# Patient Record
Sex: MALE | Race: BLACK OR AFRICAN AMERICAN | NOT HISPANIC OR LATINO | Employment: OTHER | URBAN - METROPOLITAN AREA
[De-identification: names, ages, dates, MRNs, and addresses within clinical notes are randomized per-mention and may not be internally consistent; named-entity substitution may affect disease eponyms.]

---

## 2020-01-27 ENCOUNTER — OFFICE VISIT (OUTPATIENT)
Dept: FAMILY MEDICINE CLINIC | Facility: CLINIC | Age: 60
End: 2020-01-27

## 2020-01-27 VITALS
BODY MASS INDEX: 28.99 KG/M2 | HEIGHT: 65 IN | WEIGHT: 174 LBS | TEMPERATURE: 97.9 F | DIASTOLIC BLOOD PRESSURE: 80 MMHG | SYSTOLIC BLOOD PRESSURE: 122 MMHG | OXYGEN SATURATION: 98 % | HEART RATE: 91 BPM

## 2020-01-27 DIAGNOSIS — Z79.899 HIGH RISK MEDICATION USE: ICD-10-CM

## 2020-01-27 DIAGNOSIS — E78.5 HYPERLIPIDEMIA, UNSPECIFIED HYPERLIPIDEMIA TYPE: ICD-10-CM

## 2020-01-27 DIAGNOSIS — Z23 IMMUNIZATION DUE: ICD-10-CM

## 2020-01-27 DIAGNOSIS — I10 ESSENTIAL HYPERTENSION: ICD-10-CM

## 2020-01-27 DIAGNOSIS — Z11.59 ENCOUNTER FOR HEPATITIS C SCREENING TEST FOR LOW RISK PATIENT: ICD-10-CM

## 2020-01-27 DIAGNOSIS — Z00.00 MEDICARE ANNUAL WELLNESS VISIT, SUBSEQUENT: Primary | ICD-10-CM

## 2020-01-27 PROCEDURE — G0008 ADMIN INFLUENZA VIRUS VAC: HCPCS | Performed by: FAMILY MEDICINE

## 2020-01-27 PROCEDURE — 99213 OFFICE O/P EST LOW 20 MIN: CPT | Performed by: FAMILY MEDICINE

## 2020-01-27 PROCEDURE — 90686 IIV4 VACC NO PRSV 0.5 ML IM: CPT | Performed by: FAMILY MEDICINE

## 2020-01-27 PROCEDURE — G0439 PPPS, SUBSEQ VISIT: HCPCS | Performed by: FAMILY MEDICINE

## 2020-01-27 RX ORDER — CYCLOBENZAPRINE HCL 10 MG
TABLET ORAL
COMMUNITY
End: 2021-07-16

## 2020-01-27 RX ORDER — ATORVASTATIN CALCIUM 20 MG/1
TABLET, FILM COATED ORAL
COMMUNITY
Start: 2018-02-19 | End: 2020-08-12

## 2020-01-27 RX ORDER — HYDROCODONE BITARTRATE AND ACETAMINOPHEN 10; 325 MG/1; MG/1
TABLET ORAL EVERY 12 HOURS SCHEDULED
COMMUNITY
End: 2020-03-30 | Stop reason: DRUGHIGH

## 2020-01-27 RX ORDER — AMLODIPINE BESYLATE 5 MG/1
TABLET ORAL
COMMUNITY
End: 2020-01-27 | Stop reason: SDUPTHER

## 2020-01-27 RX ORDER — LISINOPRIL AND HYDROCHLOROTHIAZIDE 12.5; 1 MG/1; MG/1
TABLET ORAL
COMMUNITY
End: 2020-01-27 | Stop reason: SDUPTHER

## 2020-01-27 RX ORDER — LISINOPRIL AND HYDROCHLOROTHIAZIDE 12.5; 1 MG/1; MG/1
2 TABLET ORAL DAILY
Qty: 60 TABLET | Refills: 11 | Status: SHIPPED | OUTPATIENT
Start: 2020-01-27 | End: 2020-03-30

## 2020-01-27 RX ORDER — AMITRIPTYLINE HYDROCHLORIDE 25 MG/1
TABLET, FILM COATED ORAL
COMMUNITY
Start: 2018-02-05 | End: 2021-04-16

## 2020-01-27 RX ORDER — AMLODIPINE BESYLATE 5 MG/1
5 TABLET ORAL DAILY
Qty: 30 TABLET | Refills: 11 | Status: SHIPPED | OUTPATIENT
Start: 2020-01-27 | End: 2021-04-12 | Stop reason: SDUPTHER

## 2020-01-27 RX ORDER — GABAPENTIN 100 MG/1
CAPSULE ORAL EVERY 8 HOURS SCHEDULED
COMMUNITY
End: 2022-05-12

## 2020-01-27 NOTE — PROGRESS NOTES
The ABCs of the Annual Wellness Visit  Subsequent Medicare Wellness Visit    Chief Complaint   Patient presents with   • Hypertension   • Medicare Wellness-subsequent       Subjective   History of Present Illness:  Primo Sommers is a 59 y.o. male who presents for a Subsequent Medicare Wellness Visit.    HEALTH RISK ASSESSMENT    Recent Hospitalizations:  No hospitalization(s) within the last year.    Current Medical Providers:  Patient Care Team:  Lindsay Fernandes MD as PCP - General (Family Medicine)    Smoking Status:  Social History     Tobacco Use   Smoking Status Not on file       Alcohol Consumption:  Social History     Substance and Sexual Activity   Alcohol Use Not on file       Depression Screen:   PHQ-2/PHQ-9 Depression Screening 1/27/2020   Little interest or pleasure in doing things 0   Feeling down, depressed, or hopeless 0   Trouble falling or staying asleep, or sleeping too much 0   Feeling tired or having little energy 0   Poor appetite or overeating 0   Feeling bad about yourself - or that you are a failure or have let yourself or your family down 0   Trouble concentrating on things, such as reading the newspaper or watching television 0   Moving or speaking so slowly that other people could have noticed. Or the opposite - being so fidgety or restless that you have been moving around a lot more than usual 0   Thoughts that you would be better off dead, or of hurting yourself in some way 0   Total Score 0   If you checked off any problems, how difficult have these problems made it for you to do your work, take care of things at home, or get along with other people? Not difficult at all       Fall Risk Screen:  STEADI Fall Risk Assessment was completed, and patient is at MODERATE risk for falls. Assessment completed on:1/27/2020    Health Habits and Functional and Cognitive Screening:  Functional & Cognitive Status 1/27/2020   Do you have difficulty preparing food and eating? No   Do you have  difficulty bathing yourself, getting dressed or grooming yourself? No   Do you have difficulty using the toilet? No   Do you have difficulty moving around from place to place? No   Do you have trouble with steps or getting out of a bed or a chair? No   Current Diet Well Balanced Diet   Dental Exam Not up to date   Eye Exam Not up to date   Exercise (times per week) 0 times per week   Current Exercise Activities Include None   Do you need help using the phone?  No   Are you deaf or do you have serious difficulty hearing?  No   Do you need help with transportation? No   Do you need help shopping? No   Do you need help preparing meals?  No   Do you need help with housework?  No   Do you need help with laundry? No   Do you need help taking your medications? No   Do you need help managing money? No   Do you ever drive or ride in a car without wearing a seat belt? No   Have you felt unusual stress, anger or loneliness in the last month? No   Who do you live with? Other   If you need help, do you have trouble finding someone available to you? No   Have you been bothered in the last four weeks by sexual problems? No   Do you have difficulty concentrating, remembering or making decisions? No         Does the patient have evidence of cognitive impairment? No    Asprin use counseling:Does not need ASA (and currently is not on it)    Age-appropriate Screening Schedule:  Refer to the list below for future screening recommendations based on patient's age, sex and/or medical conditions. Orders for these recommended tests are listed in the plan section. The patient has been provided with a written plan.    Health Maintenance   Topic Date Due   • TDAP/TD VACCINES (1 - Tdap) 12/08/1973   • ZOSTER VACCINE (1 of 2) 12/08/2012   • INFLUENZA VACCINE  08/01/2019   • COLONOSCOPY  01/10/2020   • LIPID PANEL  01/27/2020          The following portions of the patient's history were reviewed and updated as appropriate: allergies, current  "medications, past family history, past medical history, past social history, past surgical history and problem list.    Outpatient Medications Prior to Visit   Medication Sig Dispense Refill   • amitriptyline (ELAVIL) 25 MG tablet amitriptyline 25 mg tablet   Take 1 tablet every day by oral route at bedtime for 30 days.     • atorvastatin (LIPITOR) 20 MG tablet atorvastatin 20 mg tablet     • cyclobenzaprine (FLEXERIL) 10 MG tablet cyclobenzaprine 10 mg tablet   Take 1 tablet every day by oral route as needed for 30 days.     • gabapentin (NEURONTIN) 100 MG capsule Every 8 (Eight) Hours.     • HYDROcodone-acetaminophen (NORCO)  MG per tablet Every 12 (Twelve) Hours.     • amLODIPine (NORVASC) 5 MG tablet amlodipine 5 mg tablet     • lisinopril-hydrochlorothiazide (PRINZIDE,ZESTORETIC) 10-12.5 MG per tablet lisinopril 10 mg-hydrochlorothiazide 12.5 mg tablet       No facility-administered medications prior to visit.        Patient Active Problem List   Diagnosis   • Medicare annual wellness visit, subsequent   • Essential hypertension   • Immunization due   • High risk medication use   • Hyperlipidemia       Advanced Care Planning:  Patient does not have an advance directive - information provided to the patient today    Review of Systems   Musculoskeletal: Positive for arthralgias.   All other systems reviewed and are negative.      Compared to one year ago, the patient feels his physical health is worse.  Compared to one year ago, the patient feels his mental health is the same.    Reviewed chart for potential of high risk medication in the elderly: yes  Reviewed chart for potential of harmful drug interactions in the elderly:yes    Objective         Vitals:    01/27/20 0752   BP: 122/80   Pulse: 91   Temp: 97.9 °F (36.6 °C)   SpO2: 98%   Weight: 78.9 kg (174 lb)   Height: 165.1 cm (65\")       Body mass index is 28.96 kg/m².  Discussed the patient's BMI with him. The BMI is above average; BMI management plan " is completed.    Physical Exam   Constitutional: He appears well-developed and well-nourished. No distress.   Cardiovascular: Normal rate and regular rhythm.   Pulmonary/Chest: Effort normal and breath sounds normal. No respiratory distress. He has no wheezes.   Skin: He is not diaphoretic.   Nursing note and vitals reviewed.      Lab Results   Component Value Date    GLU 62 (L) 01/06/2020        Assessment/Plan   Medicare Risks and Personalized Health Plan  CMS Preventative Services Quick Reference  Fall Risk    The above risks/problems have been discussed with the patient.  Pertinent information has been shared with the patient in the After Visit Summary.  Follow up plans and orders are seen below in the Assessment/Plan Section.    Diagnoses and all orders for this visit:    1. Medicare annual wellness visit, subsequent (Primary)    2. Essential hypertension  -     lisinopril-hydrochlorothiazide (PRINZIDE,ZESTORETIC) 10-12.5 MG per tablet; Take 2 tablets by mouth Daily.  Dispense: 60 tablet; Refill: 11  -     amLODIPine (NORVASC) 5 MG tablet; Take 1 tablet by mouth Daily.  Dispense: 30 tablet; Refill: 11    3. Immunization due  -     Fluarix/Fluzone/Afluria Quad>6 Months    4. Hyperlipidemia, unspecified hyperlipidemia type    5. High risk medication use    6. Encounter for hepatitis C screening test for low risk patient  -     Hepatitis C Antibody      Follow Up:  Return in about 1 year (around 1/27/2021) for Medicare Wellness.     An After Visit Summary and PPPS were given to the patient.

## 2020-01-27 NOTE — PATIENT INSTRUCTIONS
Medicare Wellness  Personal Prevention Plan of Service     Date of Office Visit:  2020  Encounter Provider:  Lindsay Fernandes MD  Place of Service:  [facility]  Patient Name: Primo Sommers  :  1960    As part of the Medicare Wellness portion of your visit today, we are providing you with this personalized preventive plan of services (PPPS). This plan is based upon recommendations of the [place] Preventive Services Task Force (USPSTF) and the Advisory Committee on Immunization Practices (ACIP).    This lists the preventive care services that should be considered, and provides dates of when you are due. Items listed as completed are up-to-date and do not require any further intervention.    Health Maintenance   Topic Date Due   • TDAP/TD VACCINES (1 - Tdap) 1973   • ZOSTER VACCINE (1 of 2) 2012   • INFLUENZA VACCINE  2019   • HEPATITIS C SCREENING  01/10/2020   • COLONOSCOPY  01/10/2020   • MEDICARE ANNUAL WELLNESS  2021       No orders of the defined types were placed in this encounter.      Return in about 1 year (around 2021) for Medicare Wellness.

## 2020-01-27 NOTE — PROGRESS NOTES
Subjective   Primo Sommers is a 59 y.o. male.     Chief Complaint   Patient presents with   • Hypertension   • Medicare Wellness-subsequent   • Hyperlipidemia       Hypertension   This is a chronic problem. The current episode started more than 1 year ago. The problem is unchanged. The problem is controlled.   Hyperlipidemia   This is a chronic problem. The problem is controlled. Recent lipid tests were reviewed and are normal.          The following portions of the patient's history were reviewed and updated as appropriate: allergies, current medications, past family history, past medical history, past social history, past surgical history and problem list.    Past Medical History:   Diagnosis Date   • Hyperlipidemia    • Hypertension        No past surgical history on file.    No family history on file.    Social History     Socioeconomic History   • Marital status: Single     Spouse name: Not on file   • Number of children: Not on file   • Years of education: Not on file   • Highest education level: Not on file       Current Outpatient Medications on File Prior to Visit   Medication Sig Dispense Refill   • amitriptyline (ELAVIL) 25 MG tablet amitriptyline 25 mg tablet   Take 1 tablet every day by oral route at bedtime for 30 days.     • atorvastatin (LIPITOR) 20 MG tablet atorvastatin 20 mg tablet     • cyclobenzaprine (FLEXERIL) 10 MG tablet cyclobenzaprine 10 mg tablet   Take 1 tablet every day by oral route as needed for 30 days.     • gabapentin (NEURONTIN) 100 MG capsule Every 8 (Eight) Hours.     • HYDROcodone-acetaminophen (NORCO)  MG per tablet Every 12 (Twelve) Hours.     • [DISCONTINUED] amLODIPine (NORVASC) 5 MG tablet amlodipine 5 mg tablet     • [DISCONTINUED] lisinopril-hydrochlorothiazide (PRINZIDE,ZESTORETIC) 10-12.5 MG per tablet lisinopril 10 mg-hydrochlorothiazide 12.5 mg tablet       No current facility-administered medications on file prior to visit.        Review of Systems    Musculoskeletal: Positive for arthralgias and back pain.   All other systems reviewed and are negative.      Recent Results (from the past 4704 hour(s))   BASIC METABOLIC PANEL    Collection Time: 01/06/20 10:19 AM   Result Value Ref Range    Sodium 144 137 - 145 mmol/L    Potassium 3.7 3.5 - 5.1 mmol/L    Chloride 107 98 - 107 mmol/L    Total CO2 30 22 - 30 mmol/L    Glucose 62 (L) 74 - 99 mg/dL    BUN 10 7 - 20 mg/dL    Creatinine 0.7 0.7 - 1.5 mg/dL    BUN/Creatinine Ratio 14.3 RATIO    Est GFR by Clearance >60 >60 /1.73 m2    Calcium 9.5 8.4 - 10.2 mg/dL   TYPE AND SCREEN    Collection Time: 01/06/20 10:19 AM   Result Value Ref Range    ABORh AB Positive     Antibody Screen Negative     Crossmatch Expiration 01/09/2020    CBC AND DIFFERENTIAL    Collection Time: 01/06/20 10:19 AM   Result Value Ref Range    WBC 11.64 (H) 4.5 - 11.0 10*3/uL    RBC 5.27 4.5 - 5.9 10*6/uL    Hemoglobin 15.1 13.5 - 17.5 g/dL    Hematocrit 45.7 41.0 - 53.0 %    MCV 86.7 80.0 - 100.0 fL    MCH 28.7 26.0 - 34.0 pg    MCHC 33.0 31.0 - 37.0 g/dL    RDW 15.7 12.0 - 16.8 %    Platelets 240 140 - 440 10*3/uL    MPV 9.8 6.7 - 10.8 fL    Differential Type CBC w/Manual Differential (arb'U)    Neutrophil Rel % 45.5 45 - 80 %    Lymphocyte Rel % 39.5 15 - 50 %    Monocyte Rel % 5.3 0 - 15 %    Eosinophil % 7.0 0 - 7 %    Basophil Rel % 1.8 0 - 2 %    nRBC 0 0 /100(WBC)    Neutrophils Absolute 5.30 1.5 - 7.5 /uL    Atypical Lymphocyte Rel % 0.9 0 - 8 %    Neutrophils Absolute 5.30 2.0 - 8.8 10*3/uL    Lymphocytes Absolute 4.60 0.7 - 5.5 10*3/uL    Monocytes Absolute 0.62 0.0 - 1.7 10*3/uL    Eosinophils Absolute 0.81 (H) 0.0 - 0.8 10*3/uL    Basophils Absolute 0.21 (H) 0.0 - 0.2 10*3/uL    Atypical Lymphocytes Absolute 0.10 0.0 - 0.9 10*3/uL    RBC Comment Normal Normal    Platelet Estimate Adequate Adequate     Objective   Vitals:    01/27/20 0752   BP: 122/80   Pulse: 91   Temp: 97.9 °F (36.6 °C)   SpO2: 98%   Weight: 78.9 kg (174 lb)  "  Height: 165.1 cm (65\")     Body mass index is 28.96 kg/m².  Physical Exam   Constitutional: He appears well-developed and well-nourished. No distress.   Cardiovascular: Normal rate and regular rhythm.   Pulmonary/Chest: Effort normal and breath sounds normal. No respiratory distress. He has no wheezes.   Skin: He is not diaphoretic.   Nursing note and vitals reviewed.        Assessment/Plan   Primo was seen today for hypertension, medicare wellness-subsequent and hyperlipidemia.    Diagnoses and all orders for this visit:    Medicare annual wellness visit, subsequent    Essential hypertension  -     lisinopril-hydrochlorothiazide (PRINZIDE,ZESTORETIC) 10-12.5 MG per tablet; Take 2 tablets by mouth Daily.  -     amLODIPine (NORVASC) 5 MG tablet; Take 1 tablet by mouth Daily.    Immunization due  -     Fluarix/Fluzone/Afluria Quad>6 Months    Hyperlipidemia, unspecified hyperlipidemia type    High risk medication use    Encounter for hepatitis C screening test for low risk patient  -     Hepatitis C Antibody      Return in about 3 months (around 4/27/2020) for Medicare Wellness, HYPERTENSION.           "

## 2020-01-28 LAB — HCV AB S/CO SERPL IA: 0.1 S/CO RATIO (ref 0–0.9)

## 2020-02-17 ENCOUNTER — TELEPHONE (OUTPATIENT)
Dept: FAMILY MEDICINE CLINIC | Facility: CLINIC | Age: 60
End: 2020-02-17

## 2020-02-17 NOTE — TELEPHONE ENCOUNTER
[facility] CALLED PT HAS A WOUND ON LEG ASKED FOR VERBAL FOR A NURSE TO COME AND LOOK AT HIS WOUND      GAVE VERBAL AMS 2- @3:20 PM

## 2020-02-21 ENCOUNTER — TELEPHONE (OUTPATIENT)
Dept: FAMILY MEDICINE CLINIC | Facility: CLINIC | Age: 60
End: 2020-02-21

## 2020-02-21 NOTE — TELEPHONE ENCOUNTER
Tara with [facility]  called needing orders on the following patient for pressure wound he has.  She can be reached at  [phone].

## 2020-02-21 NOTE — TELEPHONE ENCOUNTER
Spoke with christine and gave verbal for pressure wound care and continuing care.       Gave verbal.    Wound is dry, no signs of infections.  Tingling on outside of edges of wound.

## 2020-02-24 ENCOUNTER — TELEPHONE (OUTPATIENT)
Dept: FAMILY MEDICINE CLINIC | Facility: CLINIC | Age: 60
End: 2020-02-24

## 2020-02-24 NOTE — TELEPHONE ENCOUNTER
Please find number and give verbal. You did not  Take number so I have no way to contact to give verbal. Please take care of this.

## 2020-02-24 NOTE — TELEPHONE ENCOUNTER
Toshia from [facility] called and states patient is complaining of dark urine and a odor.  She requested a verbal order to have the nurse check for a UA when she comes this week.  Verbal Orders given.

## 2020-02-27 DIAGNOSIS — R31.9 URINARY TRACT INFECTION WITH HEMATURIA, SITE UNSPECIFIED: Primary | ICD-10-CM

## 2020-02-27 DIAGNOSIS — N39.0 URINARY TRACT INFECTION WITH HEMATURIA, SITE UNSPECIFIED: Primary | ICD-10-CM

## 2020-02-27 RX ORDER — CIPROFLOXACIN 500 MG/1
500 TABLET, FILM COATED ORAL 2 TIMES DAILY
Qty: 14 TABLET | Refills: 0 | Status: SHIPPED | OUTPATIENT
Start: 2020-02-27 | End: 2020-03-30 | Stop reason: DRUGHIGH

## 2020-03-12 ENCOUNTER — TELEPHONE (OUTPATIENT)
Dept: FAMILY MEDICINE CLINIC | Facility: CLINIC | Age: 60
End: 2020-03-12

## 2020-03-30 ENCOUNTER — RESULTS ENCOUNTER (OUTPATIENT)
Dept: FAMILY MEDICINE CLINIC | Facility: CLINIC | Age: 60
End: 2020-03-30

## 2020-03-30 ENCOUNTER — OFFICE VISIT (OUTPATIENT)
Dept: FAMILY MEDICINE CLINIC | Facility: CLINIC | Age: 60
End: 2020-03-30

## 2020-03-30 VITALS
TEMPERATURE: 98 F | WEIGHT: 158.8 LBS | HEIGHT: 65 IN | HEART RATE: 91 BPM | RESPIRATION RATE: 12 BRPM | OXYGEN SATURATION: 98 % | DIASTOLIC BLOOD PRESSURE: 82 MMHG | SYSTOLIC BLOOD PRESSURE: 122 MMHG | BODY MASS INDEX: 26.46 KG/M2

## 2020-03-30 DIAGNOSIS — I10 ESSENTIAL HYPERTENSION: Primary | ICD-10-CM

## 2020-03-30 DIAGNOSIS — Z12.11 COLON CANCER SCREENING: ICD-10-CM

## 2020-03-30 DIAGNOSIS — E78.5 HYPERLIPIDEMIA, UNSPECIFIED HYPERLIPIDEMIA TYPE: ICD-10-CM

## 2020-03-30 DIAGNOSIS — K59.00 CONSTIPATION, UNSPECIFIED CONSTIPATION TYPE: ICD-10-CM

## 2020-03-30 PROCEDURE — 99214 OFFICE O/P EST MOD 30 MIN: CPT | Performed by: FAMILY MEDICINE

## 2020-03-30 RX ORDER — ALBUTEROL SULFATE 90 UG/1
AEROSOL, METERED RESPIRATORY (INHALATION)
COMMUNITY
Start: 2020-02-04 | End: 2020-03-30

## 2020-03-30 RX ORDER — HYDROCODONE BITARTRATE AND ACETAMINOPHEN 7.5; 325 MG/1; MG/1
TABLET ORAL
COMMUNITY
Start: 2020-03-13 | End: 2021-11-12

## 2020-03-30 NOTE — PROGRESS NOTES
Subjective   Primo Sommers is a 59 y.o. male.     Chief Complaint   Patient presents with   • Post-op Problem     pain   • Hypertension   • Hyperlipidemia   • Constipation       Hypertension   This is a chronic problem. The current episode started more than 1 year ago. The problem is unchanged. The problem is controlled. Pertinent negatives include no blurred vision, chest pain, palpitations or shortness of breath.   Hyperlipidemia   This is a chronic problem. The current episode started more than 1 year ago. The problem is controlled. Recent lipid tests were reviewed and are normal. Pertinent negatives include no chest pain or shortness of breath.   Constipation   This is a new problem. The current episode started more than 1 month ago. The problem has been gradually improving since onset. Associated symptoms include abdominal pain. Treatments tried: miralax.          The following portions of the patient's history were reviewed and updated as appropriate: allergies, current medications, past family history, past medical history, past social history, past surgical history and problem list.    Past Medical History:   Diagnosis Date   • Advance directive discussed with patient    • Disc degeneration, lumbar    • High risk medication use    • Hyperglycemia    • Hyperlipidemia    • Hypertension    • Leukocytosis    • Medicare annual wellness visit, initial    • Medicare annual wellness visit, subsequent    • Spinal stenosis        Past Surgical History:   Procedure Laterality Date   • BACK SURGERY         Family History   Problem Relation Age of Onset   • Diabetes Mother    • Hyperlipidemia Mother    • Hypertension Mother    • Kidney disease Mother    • Heart disease Mother    • Hypertension Father    • Depression Brother        Social History     Socioeconomic History   • Marital status: Single     Spouse name: Not on file   • Number of children: Not on file   • Years of education: Not on file   • Highest  education level: Not on file   Tobacco Use   • Smoking status: Current Every Day Smoker   • Smokeless tobacco: Never Used   Substance and Sexual Activity   • Alcohol use: Not Currently   • Drug use: Never   • Sexual activity: Yes       Current Outpatient Medications on File Prior to Visit   Medication Sig Dispense Refill   • amitriptyline (ELAVIL) 25 MG tablet amitriptyline 25 mg tablet   Take 1 tablet every day by oral route at bedtime for 30 days.     • amLODIPine (NORVASC) 5 MG tablet Take 1 tablet by mouth Daily. 30 tablet 11   • atorvastatin (LIPITOR) 20 MG tablet atorvastatin 20 mg tablet     • cyclobenzaprine (FLEXERIL) 10 MG tablet cyclobenzaprine 10 mg tablet   Take 1 tablet every day by oral route as needed for 30 days.     • gabapentin (NEURONTIN) 100 MG capsule Every 8 (Eight) Hours.     • HYDROcodone-acetaminophen (NORCO) 7.5-325 MG per tablet      • Naloxegol Oxalate (Movantik) 25 MG tablet Movantik 25 mg tablet   Take 1 tablet every day by oral route as directed for 30 days.     • [DISCONTINUED] albuterol sulfate  (90 Base) MCG/ACT inhaler      • [DISCONTINUED] ciprofloxacin (CIPRO) 500 MG tablet Take 1 tablet by mouth 2 (Two) Times a Day. 14 tablet 0   • [DISCONTINUED] HYDROcodone-acetaminophen (NORCO)  MG per tablet Every 12 (Twelve) Hours.     • [DISCONTINUED] lisinopril-hydrochlorothiazide (PRINZIDE,ZESTORETIC) 10-12.5 MG per tablet Take 2 tablets by mouth Daily. 60 tablet 11     No current facility-administered medications on file prior to visit.        Review of Systems   Constitutional: Negative for fatigue.   Eyes: Negative for blurred vision.   Respiratory: Negative for cough, chest tightness and shortness of breath.    Cardiovascular: Negative for chest pain, palpitations and leg swelling.   Gastrointestinal: Positive for abdominal pain and constipation.   Neurological: Negative for dizziness, light-headedness and headache.       Recent Results (from the past 4704 hour(s))    BASIC METABOLIC PANEL    Collection Time: 01/06/20 10:19 AM   Result Value Ref Range    Sodium 144 137 - 145 mmol/L    Potassium 3.7 3.5 - 5.1 mmol/L    Chloride 107 98 - 107 mmol/L    Total CO2 30 22 - 30 mmol/L    Glucose 62 (L) 74 - 99 mg/dL    BUN 10 7 - 20 mg/dL    Creatinine 0.7 0.7 - 1.5 mg/dL    BUN/Creatinine Ratio 14.3 RATIO    Est GFR by Clearance >60 >60 /1.73 m2    Calcium 9.5 8.4 - 10.2 mg/dL   TYPE AND SCREEN    Collection Time: 01/06/20 10:19 AM   Result Value Ref Range    ABORh AB Positive     Antibody Screen Negative     Crossmatch Expiration 01/09/2020    CBC AND DIFFERENTIAL    Collection Time: 01/06/20 10:19 AM   Result Value Ref Range    WBC 11.64 (H) 4.5 - 11.0 10*3/uL    RBC 5.27 4.5 - 5.9 10*6/uL    Hemoglobin 15.1 13.5 - 17.5 g/dL    Hematocrit 45.7 41.0 - 53.0 %    MCV 86.7 80.0 - 100.0 fL    MCH 28.7 26.0 - 34.0 pg    MCHC 33.0 31.0 - 37.0 g/dL    RDW 15.7 12.0 - 16.8 %    Platelets 240 140 - 440 10*3/uL    MPV 9.8 6.7 - 10.8 fL    Differential Type CBC w/Manual Differential (arb'U)    Neutrophil Rel % 45.5 45 - 80 %    Lymphocyte Rel % 39.5 15 - 50 %    Monocyte Rel % 5.3 0 - 15 %    Eosinophil % 7.0 0 - 7 %    Basophil Rel % 1.8 0 - 2 %    nRBC 0 0 /100(WBC)    Neutrophils Absolute 5.30 1.5 - 7.5 /uL    Atypical Lymphocyte Rel % 0.9 0 - 8 %    Neutrophils Absolute 5.30 2.0 - 8.8 10*3/uL    Lymphocytes Absolute 4.60 0.7 - 5.5 10*3/uL    Monocytes Absolute 0.62 0.0 - 1.7 10*3/uL    Eosinophils Absolute 0.81 (H) 0.0 - 0.8 10*3/uL    Basophils Absolute 0.21 (H) 0.0 - 0.2 10*3/uL    Atypical Lymphocytes Absolute 0.10 0.0 - 0.9 10*3/uL    RBC Comment Normal Normal    Platelet Estimate Adequate Adequate   Hepatitis C Antibody    Collection Time: 01/27/20  8:43 AM   Result Value Ref Range    Hep C Virus Ab 0.1 0.0 - 0.9 s/co ratio   POTASSIUM    Collection Time: 01/29/20  6:04 AM   Result Value Ref Range    Potassium 4.1 3.5 - 5.1 mmol/L   BASIC METABOLIC PANEL    Collection Time: 01/30/20   3:03 AM   Result Value Ref Range    Sodium 141 137 - 145 mmol/L    Potassium 3.7 3.5 - 5.1 mmol/L    Chloride 105 98 - 107 mmol/L    Total CO2 29 22 - 30 mmol/L    Glucose 95 74 - 99 mg/dL    BUN 10 7 - 20 mg/dL    Creatinine 0.7 0.7 - 1.5 mg/dL    BUN/Creatinine Ratio 14.3 RATIO    Est GFR by Clearance >60 >60 /1.73 m2    Calcium 9.5 8.4 - 10.2 mg/dL   PHOSPHORUS    Collection Time: 01/30/20  3:03 AM   Result Value Ref Range    Phosphorus 3.1 2.5 - 4.5 mg/dL   CBC AND DIFFERENTIAL    Collection Time: 01/30/20  9:21 PM   Result Value Ref Range    WBC 21.57 (H) 4.5 - 11.0 10*3/uL    RBC 5.45 4.5 - 5.9 10*6/uL    Hemoglobin 15.9 13.5 - 17.5 g/dL    Hematocrit 47.6 41.0 - 53.0 %    MCV 87.3 80.0 - 100.0 fL    MCH 29.2 26.0 - 34.0 pg    MCHC 33.4 31.0 - 37.0 g/dL    RDW 15.8 12.0 - 16.8 %    Platelets 230 140 - 440 10*3/uL    MPV 10.2 6.7 - 10.8 fL    Differential Type Hospital CBC w/AutoDiff (arb'U)    Neutrophil Rel % 75.6 45 - 80 %    Lymphocyte Rel % 18.4 15 - 50 %    Monocyte Rel % 4.8 0 - 15 %    Eosinophil % 0.8 0 - 7 %    Basophil Rel % 0.1 0 - 2 %    Immature Grans % 0.3 (H) 0 %    nRBC 0 0 /100(WBC)    Neutrophils Absolute 16.30 (H) 2.0 - 8.8 10*3/uL    Lymphocytes Absolute 3.97 0.7 - 5.5 10*3/uL    Monocytes Absolute 1.03 0.0 - 1.7 10*3/uL    Eosinophils Absolute 0.17 0.0 - 0.8 10*3/uL    Basophils Absolute 0.03 0.0 - 0.2 10*3/uL    Immature Grans, Absolute 0.07 <1 10*3/uL   PROTIME-INR    Collection Time: 01/30/20  9:21 PM   Result Value Ref Range    Protime 12.1 10.3 - 13.3 s    INR 1.0 INR   APTT    Collection Time: 01/30/20  9:21 PM   Result Value Ref Range    PTT 27.6 25.3 - 35.0 s   COMPREHENSIVE METABOLIC PANEL    Collection Time: 01/30/20  9:21 PM   Result Value Ref Range    Sodium 143 137 - 145 mmol/L    Potassium 3.3 (L) 3.5 - 5.1 mmol/L    Chloride 100 98 - 107 mmol/L    Total CO2 29 22 - 30 mmol/L    Glucose 107 (H) 74 - 99 mg/dL    BUN 10 7 - 20 mg/dL    Creatinine 0.9 0.7 - 1.5 mg/dL     BUN/Creatinine Ratio 11.1 RATIO    Est GFR by Clearance >60 >60 /1.73 m2    Total Protein 9.3 (H) 6.3 - 8.2 g/dL    Albumin 5.4 (H) 3.5 - 5.0 g/dL    Globulin 3.9 1.5 - 4.5 g/dL    A/G Ratio 1.4 1.1 - 2.5 RATIO    Calcium 10.3 (H) 8.4 - 10.2 mg/dL    Total Bilirubin 0.7 0.2 - 1.3 mg/dL    AST (SGOT) 33 15 - 46 U/L    ALT (SGPT) 21 0 - 50 U/L    Alkaline Phosphatase 148 (H) 38 - 126 U/L   LACTIC ACID, PLASMA    Collection Time: 01/30/20  9:24 PM   Result Value Ref Range    Lactate 2.9 (H) 0.7 - 2.0 mmol/L   LACTIC ACID, PLASMA    Collection Time: 01/30/20 11:51 PM   Result Value Ref Range    Lactate 3.7 (H) 0.7 - 2.0 mmol/L   CBC AND DIFFERENTIAL    Collection Time: 01/31/20  4:34 AM   Result Value Ref Range    WBC 21.99 (H) 4.5 - 11.0 10*3/uL    RBC 4.82 4.5 - 5.9 10*6/uL    Hemoglobin 13.6 13.5 - 17.5 g/dL    Hematocrit 41.2 41.0 - 53.0 %    MCV 85.5 80.0 - 100.0 fL    MCH 28.2 26.0 - 34.0 pg    MCHC 33.0 31.0 - 37.0 g/dL    RDW 15.3 12.0 - 16.8 %    Platelets 204 140 - 440 10*3/uL    MPV 10.1 6.7 - 10.8 fL    Differential Type [facility] CBC w/AutoDiff (arb'U)    Neutrophil Rel % 70.7 45 - 80 %    Lymphocyte Rel % 20.2 15 - 50 %    Monocyte Rel % 7.9 0 - 15 %    Eosinophil % 0.5 0 - 7 %    Basophil Rel % 0.2 0 - 2 %    Immature Grans % 0.5 (H) 0 %    nRBC 0 0 /100(WBC)    Neutrophils Absolute 15.54 (H) 2.0 - 8.8 10*3/uL    Lymphocytes Absolute 4.45 0.7 - 5.5 10*3/uL    Monocytes Absolute 1.73 (H) 0.0 - 1.7 10*3/uL    Eosinophils Absolute 0.11 0.0 - 0.8 10*3/uL    Basophils Absolute 0.05 0.0 - 0.2 10*3/uL    Immature Grans, Absolute 0.11 <1 10*3/uL   BASIC METABOLIC PANEL    Collection Time: 01/31/20  4:34 AM   Result Value Ref Range    Sodium 136 (L) 137 - 145 mmol/L    Potassium 3.8 3.5 - 5.1 mmol/L    Chloride 102 98 - 107 mmol/L    Total CO2 27 22 - 30 mmol/L    Glucose 93 74 - 99 mg/dL    BUN 11 7 - 20 mg/dL    Creatinine 0.8 0.7 - 1.5 mg/dL    BUN/Creatinine Ratio 13.8 RATIO    Est GFR by Clearance >60 >60  /1.73 m2    Calcium 9.0 8.4 - 10.2 mg/dL   PROCALCITONIN    Collection Time: 01/31/20  4:34 AM   Result Value Ref Range    Procalcitonin 0.13 0.0 - 0.5 ng/mL   LACTIC ACID, PLASMA    Collection Time: 01/31/20  8:22 AM   Result Value Ref Range    Lactate 0.8 0.7 - 2.0 mmol/L   HEMOGLOBIN AND HEMATOCRIT, BLOOD    Collection Time: 01/31/20  4:24 PM   Result Value Ref Range    Hemoglobin 10.5 (L) 13.5 - 17.5 g/dL    Hematocrit 32.7 (L) 41.0 - 53.0 %   CBC AND DIFFERENTIAL    Collection Time: 02/01/20  3:56 AM   Result Value Ref Range    WBC 17.06 (H) 4.5 - 11.0 10*3/uL    RBC 3.55 (L) 4.5 - 5.9 10*6/uL    Hemoglobin 10.0 (L) 13.5 - 17.5 g/dL    Hematocrit 30.6 (L) 41.0 - 53.0 %    MCV 86.2 80.0 - 100.0 fL    MCH 28.2 26.0 - 34.0 pg    MCHC 32.7 31.0 - 37.0 g/dL    RDW 15.6 12.0 - 16.8 %    Platelets 167 140 - 440 10*3/uL    MPV 10.0 6.7 - 10.8 fL    Differential Type Hospital CBC w/AutoDiff (arb'U)    Neutrophil Rel % 84.3 (H) 45 - 80 %    Lymphocyte Rel % 8.7 (L) 15 - 50 %    Monocyte Rel % 6.5 0 - 15 %    Eosinophil % 0.0 0 - 7 %    Basophil Rel % 0.1 0 - 2 %    Immature Grans % 0.4 (H) 0 %    nRBC 0 0 /100(WBC)    Neutrophils Absolute 14.39 (H) 2.0 - 8.8 10*3/uL    Lymphocytes Absolute 1.49 0.7 - 5.5 10*3/uL    Monocytes Absolute 1.11 0.0 - 1.7 10*3/uL    Eosinophils Absolute 0.00 0.0 - 0.8 10*3/uL    Basophils Absolute 0.01 0.0 - 0.2 10*3/uL    Immature Grans, Absolute 0.06 <1 10*3/uL   BASIC METABOLIC PANEL    Collection Time: 02/01/20  3:56 AM   Result Value Ref Range    Sodium 140 137 - 145 mmol/L    Potassium 4.1 3.5 - 5.1 mmol/L    Chloride 107 98 - 107 mmol/L    Total CO2 27 22 - 30 mmol/L    Glucose 126 (H) 74 - 99 mg/dL    BUN 11 7 - 20 mg/dL    Creatinine 0.6 (L) 0.7 - 1.5 mg/dL    BUN/Creatinine Ratio 18.3 RATIO    Est GFR by Clearance >60 >60 /1.73 m2    Calcium 8.7 8.4 - 10.2 mg/dL   PHOSPHORUS    Collection Time: 02/01/20  3:56 AM   Result Value Ref Range    Phosphorus 2.3 (L) 2.5 - 4.5 mg/dL   CBC AND  DIFFERENTIAL    Collection Time: 02/02/20  3:10 AM   Result Value Ref Range    WBC 17.54 (H) 4.5 - 11.0 10*3/uL    RBC 3.84 (L) 4.5 - 5.9 10*6/uL    Hemoglobin 10.8 (L) 13.5 - 17.5 g/dL    Hematocrit 33.1 (L) 41.0 - 53.0 %    MCV 86.2 80.0 - 100.0 fL    MCH 28.1 26.0 - 34.0 pg    MCHC 32.6 31.0 - 37.0 g/dL    RDW 15.5 12.0 - 16.8 %    Platelets 214 140 - 440 10*3/uL    MPV 10.0 6.7 - 10.8 fL    Differential Type Hospital CBC w/AutoDiff (arb'U)    Neutrophil Rel % 75.8 45 - 80 %    Lymphocyte Rel % 17.6 15 - 50 %    Monocyte Rel % 5.5 0 - 15 %    Eosinophil % 0.7 0 - 7 %    Basophil Rel % 0.1 0 - 2 %    Immature Grans % 0.3 (H) 0 %    nRBC 0 0 /100(WBC)    Neutrophils Absolute 13.30 (H) 2.0 - 8.8 10*3/uL    Lymphocytes Absolute 3.08 0.7 - 5.5 10*3/uL    Monocytes Absolute 0.97 0.0 - 1.7 10*3/uL    Eosinophils Absolute 0.12 0.0 - 0.8 10*3/uL    Basophils Absolute 0.02 0.0 - 0.2 10*3/uL    Immature Grans, Absolute 0.05 <1 10*3/uL   BASIC METABOLIC PANEL    Collection Time: 02/02/20  3:10 AM   Result Value Ref Range    Sodium 141 137 - 145 mmol/L    Potassium 3.8 3.5 - 5.1 mmol/L    Chloride 105 98 - 107 mmol/L    Total CO2 29 22 - 30 mmol/L    Glucose 80 74 - 99 mg/dL    BUN 10 7 - 20 mg/dL    Creatinine 0.7 0.7 - 1.5 mg/dL    BUN/Creatinine Ratio 14.3 RATIO    Est GFR by Clearance >60 >60 /1.73 m2    Calcium 8.6 8.4 - 10.2 mg/dL   VANCOMYCIN, TROUGH    Collection Time: 02/02/20 10:45 AM   Result Value Ref Range    Vancomycin Trough 13.1 10.0 - 20.0 ug/mL   PROBNP (REFERENCE)    Collection Time: 02/02/20 10:45 AM   Result Value Ref Range    .0 <125 pg/mL   BASIC METABOLIC PANEL    Collection Time: 02/03/20  4:31 AM   Result Value Ref Range    Sodium 139 137 - 145 mmol/L    Potassium 4.2 3.5 - 5.1 mmol/L    Chloride 105 98 - 107 mmol/L    Total CO2 28 22 - 30 mmol/L    Glucose 111 (H) 74 - 99 mg/dL    BUN 10 7 - 20 mg/dL    Creatinine 0.6 (L) 0.7 - 1.5 mg/dL    BUN/Creatinine Ratio 16.7 RATIO    Est GFR by  "Clearance >60 >60 /1.73 m2    Calcium 8.7 8.4 - 10.2 mg/dL   HEMOGLOBIN AND HEMATOCRIT, BLOOD    Collection Time: 02/03/20  4:31 AM   Result Value Ref Range    Hemoglobin 10.2 (L) 13.5 - 17.5 g/dL    Hematocrit 31.3 (L) 41.0 - 53.0 %     Objective   Vitals:    03/30/20 0845   BP: 122/82   BP Location: Left arm   Patient Position: Sitting   Pulse: 91   Resp: 12   Temp: 98 °F (36.7 °C)   TempSrc: Oral   SpO2: 98%   Weight: 72 kg (158 lb 12.8 oz)   Height: 165.1 cm (65\")     Body mass index is 26.43 kg/m².  Physical Exam   Constitutional: He appears well-developed and well-nourished. No distress.   Cardiovascular: Normal rate and regular rhythm.   Pulmonary/Chest: Effort normal and breath sounds normal. No respiratory distress. He has no wheezes.   Skin: He is not diaphoretic.   Nursing note and vitals reviewed.        Assessment/Plan   Primo was seen today for post-op problem, hypertension, hyperlipidemia and constipation.    Diagnoses and all orders for this visit:    Essential hypertension  -     Comprehensive Metabolic Panel  -     Lipid Panel  -     CBC & Differential    Colon cancer screening  -     Cologuard - Stool, Per Rectum; Future    Hyperlipidemia, unspecified hyperlipidemia type  -     Comprehensive Metabolic Panel  -     Lipid Panel    Constipation, unspecified constipation type    Miralax prn.    Return in about 3 months (around 6/30/2020) for HYPERTENSION.         "

## 2020-03-31 LAB
ALBUMIN SERPL-MCNC: 4 G/DL (ref 3.5–5.2)
ALBUMIN/GLOB SERPL: 1.4 G/DL
ALP SERPL-CCNC: 171 U/L (ref 39–117)
ALT SERPL-CCNC: 47 U/L (ref 1–41)
AST SERPL-CCNC: 36 U/L (ref 1–40)
BASOPHILS # BLD AUTO: 0.06 10*3/MM3 (ref 0–0.2)
BASOPHILS NFR BLD AUTO: 0.6 % (ref 0–1.5)
BILIRUB SERPL-MCNC: 0.2 MG/DL (ref 0.2–1.2)
BUN SERPL-MCNC: 8 MG/DL (ref 6–20)
BUN/CREAT SERPL: 9.6 (ref 7–25)
CALCIUM SERPL-MCNC: 10.1 MG/DL (ref 8.6–10.5)
CHLORIDE SERPL-SCNC: 105 MMOL/L (ref 98–107)
CHOLEST SERPL-MCNC: 152 MG/DL (ref 0–200)
CO2 SERPL-SCNC: 27.9 MMOL/L (ref 22–29)
CREAT SERPL-MCNC: 0.83 MG/DL (ref 0.76–1.27)
EOSINOPHIL # BLD AUTO: 0.51 10*3/MM3 (ref 0–0.4)
EOSINOPHIL NFR BLD AUTO: 4.8 % (ref 0.3–6.2)
ERYTHROCYTE [DISTWIDTH] IN BLOOD BY AUTOMATED COUNT: 14.3 % (ref 12.3–15.4)
GLOBULIN SER CALC-MCNC: 2.8 GM/DL
GLUCOSE SERPL-MCNC: 85 MG/DL (ref 65–99)
HCT VFR BLD AUTO: 36.2 % (ref 37.5–51)
HDLC SERPL-MCNC: 33 MG/DL (ref 40–60)
HGB BLD-MCNC: 12.1 G/DL (ref 13–17.7)
IMM GRANULOCYTES # BLD AUTO: 0.03 10*3/MM3 (ref 0–0.05)
IMM GRANULOCYTES NFR BLD AUTO: 0.3 % (ref 0–0.5)
LDLC SERPL CALC-MCNC: 101 MG/DL (ref 0–100)
LYMPHOCYTES # BLD AUTO: 3.98 10*3/MM3 (ref 0.7–3.1)
LYMPHOCYTES NFR BLD AUTO: 37.5 % (ref 19.6–45.3)
MCH RBC QN AUTO: 28.1 PG (ref 26.6–33)
MCHC RBC AUTO-ENTMCNC: 33.4 G/DL (ref 31.5–35.7)
MCV RBC AUTO: 84.2 FL (ref 79–97)
MONOCYTES # BLD AUTO: 0.42 10*3/MM3 (ref 0.1–0.9)
MONOCYTES NFR BLD AUTO: 4 % (ref 5–12)
NEUTROPHILS # BLD AUTO: 5.62 10*3/MM3 (ref 1.7–7)
NEUTROPHILS NFR BLD AUTO: 52.8 % (ref 42.7–76)
NRBC BLD AUTO-RTO: 0 /100 WBC (ref 0–0.2)
PLATELET # BLD AUTO: 294 10*3/MM3 (ref 140–450)
POTASSIUM SERPL-SCNC: 4.8 MMOL/L (ref 3.5–5.2)
PROT SERPL-MCNC: 6.8 G/DL (ref 6–8.5)
RBC # BLD AUTO: 4.3 10*6/MM3 (ref 4.14–5.8)
SODIUM SERPL-SCNC: 142 MMOL/L (ref 136–145)
TRIGL SERPL-MCNC: 91 MG/DL (ref 0–150)
VLDLC SERPL CALC-MCNC: 18.2 MG/DL
WBC # BLD AUTO: 10.62 10*3/MM3 (ref 3.4–10.8)

## 2020-04-03 ENCOUNTER — TELEPHONE (OUTPATIENT)
Dept: FAMILY MEDICINE CLINIC | Facility: CLINIC | Age: 60
End: 2020-04-03

## 2020-04-03 NOTE — TELEPHONE ENCOUNTER
Mikey called [facility] ([phone])  [facility] order    Continue Skill Nursing I gave a verbal

## 2020-08-12 RX ORDER — ATORVASTATIN CALCIUM 20 MG/1
TABLET, FILM COATED ORAL
Qty: 90 TABLET | Refills: 0 | Status: SHIPPED | OUTPATIENT
Start: 2020-08-12 | End: 2020-11-10

## 2020-11-10 RX ORDER — ATORVASTATIN CALCIUM 20 MG/1
TABLET, FILM COATED ORAL
Qty: 30 TABLET | Refills: 0 | Status: SHIPPED | OUTPATIENT
Start: 2020-11-10 | End: 2020-12-10

## 2020-11-12 ENCOUNTER — OFFICE VISIT (OUTPATIENT)
Dept: FAMILY MEDICINE CLINIC | Facility: CLINIC | Age: 60
End: 2020-11-12

## 2020-11-12 VITALS
SYSTOLIC BLOOD PRESSURE: 131 MMHG | WEIGHT: 162.13 LBS | HEIGHT: 65 IN | BODY MASS INDEX: 27.01 KG/M2 | DIASTOLIC BLOOD PRESSURE: 81 MMHG | HEART RATE: 80 BPM | TEMPERATURE: 97.5 F | OXYGEN SATURATION: 100 %

## 2020-11-12 DIAGNOSIS — K59.09 CHRONIC CONSTIPATION: ICD-10-CM

## 2020-11-12 DIAGNOSIS — E78.2 MIXED HYPERLIPIDEMIA: ICD-10-CM

## 2020-11-12 DIAGNOSIS — Z23 IMMUNIZATION DUE: Primary | ICD-10-CM

## 2020-11-12 DIAGNOSIS — I10 ESSENTIAL HYPERTENSION: ICD-10-CM

## 2020-11-12 PROCEDURE — G0008 ADMIN INFLUENZA VIRUS VAC: HCPCS | Performed by: FAMILY MEDICINE

## 2020-11-12 PROCEDURE — 90732 PPSV23 VACC 2 YRS+ SUBQ/IM: CPT | Performed by: FAMILY MEDICINE

## 2020-11-12 PROCEDURE — G0009 ADMIN PNEUMOCOCCAL VACCINE: HCPCS | Performed by: FAMILY MEDICINE

## 2020-11-12 PROCEDURE — 99214 OFFICE O/P EST MOD 30 MIN: CPT | Performed by: FAMILY MEDICINE

## 2020-11-12 PROCEDURE — 90686 IIV4 VACC NO PRSV 0.5 ML IM: CPT | Performed by: FAMILY MEDICINE

## 2020-11-12 NOTE — PROGRESS NOTES
Answers for HPI/ROS submitted by the patient on 11/11/2020   What is the primary reason for your visit?: Other  Please describe your symptoms.: check up  Have you had these symptoms before?: No  How long have you been having these symptoms?: 1-4 days  Please list any medications you are currently taking for this condition.: na  Please describe any probable cause for these symptoms. : na  Answers for HPI/ROS submitted by the patient on 11/11/2020   What is the primary reason for your visit?: Other  Please describe your symptoms.: check up  Have you had these symptoms before?: No  How long have you been having these symptoms?: 1-4 days  Please list any medications you are currently taking for this condition.: na  Please describe any probable cause for these symptoms. : na  Subjective   Primo Sommers is a 59 y.o. male.     Chief Complaint   Patient presents with   • Hypertension   • Follow-up       Hypertension  This is a chronic problem. The current episode started more than 1 year ago. The problem is unchanged. The problem is controlled. Pertinent negatives include no blurred vision, chest pain, palpitations or shortness of breath.   Constipation  This is a chronic problem. The current episode started more than 1 month ago. The problem has been rapidly worsening since onset. His stool frequency is 1 time per day. He has tried laxatives and stool softeners for the symptoms. The treatment provided mild relief.   Hyperlipidemia  This is a chronic problem. The current episode started more than 1 year ago. The problem is controlled. Recent lipid tests were reviewed and are normal. Pertinent negatives include no chest pain or shortness of breath.          The following portions of the patient's history were reviewed and updated as appropriate: allergies, current medications, past family history, past medical history, past social history, past surgical history and problem list.    Past Medical History:   Diagnosis  Date   • Advance directive discussed with patient    • Disc degeneration, lumbar    • High risk medication use    • Hyperglycemia    • Hyperlipidemia    • Hypertension    • Leukocytosis    • Medicare annual wellness visit, initial    • Medicare annual wellness visit, subsequent    • Spinal stenosis        Past Surgical History:   Procedure Laterality Date   • BACK SURGERY         Family History   Problem Relation Age of Onset   • Diabetes Mother    • Hyperlipidemia Mother    • Hypertension Mother    • Kidney disease Mother    • Heart disease Mother    • Hypertension Father    • Depression Brother        Social History     Socioeconomic History   • Marital status: Single     Spouse name: Not on file   • Number of children: Not on file   • Years of education: Not on file   • Highest education level: Not on file   Tobacco Use   • Smoking status: Current Every Day Smoker   • Smokeless tobacco: Never Used   Substance and Sexual Activity   • Alcohol use: Not Currently   • Drug use: Never   • Sexual activity: Yes       Current Outpatient Medications on File Prior to Visit   Medication Sig Dispense Refill   • amitriptyline (ELAVIL) 25 MG tablet amitriptyline 25 mg tablet   Take 1 tablet every day by oral route at bedtime for 30 days.     • amLODIPine (NORVASC) 5 MG tablet Take 1 tablet by mouth Daily. 30 tablet 11   • atorvastatin (LIPITOR) 20 MG tablet TAKE ONE TABLET BY MOUTH DAILY 30 tablet 0   • cyclobenzaprine (FLEXERIL) 10 MG tablet cyclobenzaprine 10 mg tablet   Take 1 tablet every day by oral route as needed for 30 days.     • gabapentin (NEURONTIN) 100 MG capsule Every 8 (Eight) Hours.     • HYDROcodone-acetaminophen (NORCO) 7.5-325 MG per tablet      • Naloxegol Oxalate (Movantik) 25 MG tablet Movantik 25 mg tablet   Take 1 tablet every day by oral route as directed for 30 days.       No current facility-administered medications on file prior to visit.        Review of Systems   Constitutional: Negative for  "fatigue.   Eyes: Negative for blurred vision.   Respiratory: Negative for cough, chest tightness and shortness of breath.    Cardiovascular: Negative for chest pain, palpitations and leg swelling.   Gastrointestinal: Positive for constipation.   Neurological: Negative for dizziness, light-headedness and headache.       No results found for this or any previous visit (from the past 4704 hour(s)).  Objective   Vitals:    11/12/20 0808   BP: 131/81   Pulse: 80   Temp: 97.5 °F (36.4 °C)   SpO2: 100%   Weight: 73.5 kg (162 lb 2 oz)   Height: 165.1 cm (65\")     Body mass index is 26.98 kg/m².  Physical Exam  Vitals signs and nursing note reviewed.   Constitutional:       General: He is not in acute distress.     Appearance: He is well-developed. He is not diaphoretic.   Cardiovascular:      Rate and Rhythm: Normal rate and regular rhythm.   Pulmonary:      Effort: Pulmonary effort is normal. No respiratory distress.      Breath sounds: Normal breath sounds. No wheezing.           Diagnoses and all orders for this visit:    1. Immunization due (Primary)  -     FluLaval Quad >6 Months ([id number])  -     Pneumococcal Polysaccharide Vaccine 23-Valent Greater Than or Equal To 3yo Subcutaneous / IM    2. Essential hypertension  -     Comprehensive Metabolic Panel  -     Lipid Panel  -     CBC & Differential  -     Microalbumin / Creatinine Urine Ratio - Urine, Clean Catch    3. Mixed hyperlipidemia  -     Comprehensive Metabolic Panel  -     Lipid Panel    4. Chronic constipation  -     Ambulatory Referral to Gastroenterology        Return in about 3 months (around 2/12/2021) for Medicare Wellness.        "

## 2020-11-13 LAB
ALBUMIN SERPL-MCNC: 4.2 G/DL (ref 3.5–5.2)
ALBUMIN/CREAT UR: 28 MG/G CREAT (ref 0–29)
ALBUMIN/GLOB SERPL: 1.7 G/DL
ALP SERPL-CCNC: 155 U/L (ref 39–117)
ALT SERPL-CCNC: 15 U/L (ref 1–41)
AST SERPL-CCNC: 14 U/L (ref 1–40)
BASOPHILS # BLD AUTO: 0.08 10*3/MM3 (ref 0–0.2)
BASOPHILS NFR BLD AUTO: 0.7 % (ref 0–1.5)
BILIRUB SERPL-MCNC: 0.2 MG/DL (ref 0–1.2)
BUN SERPL-MCNC: 8 MG/DL (ref 6–20)
BUN/CREAT SERPL: 9.9 (ref 7–25)
CALCIUM SERPL-MCNC: 9.4 MG/DL (ref 8.6–10.5)
CHLORIDE SERPL-SCNC: 105 MMOL/L (ref 98–107)
CHOLEST SERPL-MCNC: 145 MG/DL (ref 0–200)
CO2 SERPL-SCNC: 28.8 MMOL/L (ref 22–29)
CREAT SERPL-MCNC: 0.81 MG/DL (ref 0.76–1.27)
CREAT UR-MCNC: 62.1 MG/DL
EOSINOPHIL # BLD AUTO: 0.41 10*3/MM3 (ref 0–0.4)
EOSINOPHIL NFR BLD AUTO: 3.8 % (ref 0.3–6.2)
ERYTHROCYTE [DISTWIDTH] IN BLOOD BY AUTOMATED COUNT: 14.2 % (ref 12.3–15.4)
GLOBULIN SER CALC-MCNC: 2.5 GM/DL
GLUCOSE SERPL-MCNC: 75 MG/DL (ref 65–99)
HCT VFR BLD AUTO: 42.7 % (ref 37.5–51)
HDLC SERPL-MCNC: 37 MG/DL (ref 40–60)
HGB BLD-MCNC: 14.2 G/DL (ref 13–17.7)
IMM GRANULOCYTES # BLD AUTO: 0.05 10*3/MM3 (ref 0–0.05)
IMM GRANULOCYTES NFR BLD AUTO: 0.5 % (ref 0–0.5)
LDLC SERPL CALC-MCNC: 93 MG/DL (ref 0–100)
LYMPHOCYTES # BLD AUTO: 4.02 10*3/MM3 (ref 0.7–3.1)
LYMPHOCYTES NFR BLD AUTO: 37.2 % (ref 19.6–45.3)
MCH RBC QN AUTO: 27.8 PG (ref 26.6–33)
MCHC RBC AUTO-ENTMCNC: 33.3 G/DL (ref 31.5–35.7)
MCV RBC AUTO: 83.6 FL (ref 79–97)
MICROALBUMIN UR-MCNC: 17.5 UG/ML
MONOCYTES # BLD AUTO: 0.54 10*3/MM3 (ref 0.1–0.9)
MONOCYTES NFR BLD AUTO: 5 % (ref 5–12)
NEUTROPHILS # BLD AUTO: 5.71 10*3/MM3 (ref 1.7–7)
NEUTROPHILS NFR BLD AUTO: 52.8 % (ref 42.7–76)
NRBC BLD AUTO-RTO: 0 /100 WBC (ref 0–0.2)
PLATELET # BLD AUTO: 236 10*3/MM3 (ref 140–450)
POTASSIUM SERPL-SCNC: 4.2 MMOL/L (ref 3.5–5.2)
PROT SERPL-MCNC: 6.7 G/DL (ref 6–8.5)
RBC # BLD AUTO: 5.11 10*6/MM3 (ref 4.14–5.8)
SODIUM SERPL-SCNC: 143 MMOL/L (ref 136–145)
TRIGL SERPL-MCNC: 78 MG/DL (ref 0–150)
VLDLC SERPL CALC-MCNC: 15 MG/DL (ref 5–40)
WBC # BLD AUTO: 10.81 10*3/MM3 (ref 3.4–10.8)

## 2020-12-10 RX ORDER — ATORVASTATIN CALCIUM 20 MG/1
TABLET, FILM COATED ORAL
Qty: 30 TABLET | Refills: 2 | Status: SHIPPED | OUTPATIENT
Start: 2020-12-10 | End: 2021-04-12 | Stop reason: SDUPTHER

## 2021-04-09 DIAGNOSIS — I10 ESSENTIAL HYPERTENSION: ICD-10-CM

## 2021-04-12 RX ORDER — AMLODIPINE BESYLATE 5 MG/1
5 TABLET ORAL DAILY
Qty: 30 TABLET | Refills: 11 | Status: SHIPPED | OUTPATIENT
Start: 2021-04-12 | End: 2021-07-16

## 2021-04-12 RX ORDER — ATORVASTATIN CALCIUM 20 MG/1
TABLET, FILM COATED ORAL
Qty: 90 TABLET | Refills: 1 | OUTPATIENT
Start: 2021-04-12

## 2021-04-12 RX ORDER — LISINOPRIL AND HYDROCHLOROTHIAZIDE 12.5; 1 MG/1; MG/1
TABLET ORAL
Qty: 180 TABLET | Refills: 10 | OUTPATIENT
Start: 2021-04-12

## 2021-04-12 RX ORDER — ATORVASTATIN CALCIUM 20 MG/1
20 TABLET, FILM COATED ORAL DAILY
Qty: 30 TABLET | Refills: 2 | Status: SHIPPED | OUTPATIENT
Start: 2021-04-12 | End: 2021-08-09

## 2021-04-12 RX ORDER — AMLODIPINE BESYLATE 5 MG/1
TABLET ORAL
Qty: 90 TABLET | Refills: 10 | OUTPATIENT
Start: 2021-04-12

## 2021-04-16 ENCOUNTER — OFFICE VISIT (OUTPATIENT)
Dept: FAMILY MEDICINE CLINIC | Facility: CLINIC | Age: 61
End: 2021-04-16

## 2021-04-16 VITALS
HEIGHT: 65 IN | HEART RATE: 76 BPM | DIASTOLIC BLOOD PRESSURE: 82 MMHG | SYSTOLIC BLOOD PRESSURE: 151 MMHG | TEMPERATURE: 96.9 F | OXYGEN SATURATION: 97 % | BODY MASS INDEX: 25.49 KG/M2 | WEIGHT: 153 LBS

## 2021-04-16 DIAGNOSIS — I10 ESSENTIAL HYPERTENSION: ICD-10-CM

## 2021-04-16 DIAGNOSIS — E78.2 MIXED HYPERLIPIDEMIA: ICD-10-CM

## 2021-04-16 DIAGNOSIS — Z79.899 HIGH RISK MEDICATION USE: ICD-10-CM

## 2021-04-16 DIAGNOSIS — Z00.00 MEDICARE ANNUAL WELLNESS VISIT, SUBSEQUENT: Primary | ICD-10-CM

## 2021-04-16 PROBLEM — M47.12 OSTEOARTHRITIS OF CERVICAL SPINE WITH MYELOPATHY: Status: ACTIVE | Noted: 2018-04-16

## 2021-04-16 PROBLEM — Z72.0 TOBACCO USE: Status: ACTIVE | Noted: 2020-01-29

## 2021-04-16 PROBLEM — M54.50 LOW BACK PAIN: Status: ACTIVE | Noted: 2017-08-15

## 2021-04-16 PROBLEM — IMO0002 PSEUDOARTHROSIS OF SPINE: Status: ACTIVE | Noted: 2018-03-05

## 2021-04-16 PROBLEM — K59.03 DRUG-INDUCED CONSTIPATION: Status: ACTIVE | Noted: 2020-03-17

## 2021-04-16 PROBLEM — IMO0002 HERNIATION OF INTERVERTEBRAL DISC: Status: ACTIVE | Noted: 2017-08-15

## 2021-04-16 PROBLEM — R33.9 URINARY RETENTION: Status: ACTIVE | Noted: 2020-01-29

## 2021-04-16 PROBLEM — T81.9XXA COMPLICATION OF SURGICAL PROCEDURE: Status: ACTIVE | Noted: 2017-08-15

## 2021-04-16 PROBLEM — M79.604 PAIN IN BOTH LOWER EXTREMITIES: Status: ACTIVE | Noted: 2018-02-05

## 2021-04-16 PROBLEM — F90.9 LONG-TERM CURRENT USE OF DRUG THERAPY FOR ATTENTION DEFICIT HYPERACTIVITY DISORDER (ADHD): Status: ACTIVE | Noted: 2017-08-15

## 2021-04-16 PROBLEM — M79.605 PAIN IN BOTH LOWER EXTREMITIES: Status: ACTIVE | Noted: 2018-02-05

## 2021-04-16 PROCEDURE — G0439 PPPS, SUBSEQ VISIT: HCPCS | Performed by: FAMILY MEDICINE

## 2021-04-16 RX ORDER — METHYLNALTREXONE BROMIDE 150 MG/1
TABLET ORAL
COMMUNITY
End: 2021-04-16

## 2021-04-16 RX ORDER — LISINOPRIL AND HYDROCHLOROTHIAZIDE 12.5; 1 MG/1; MG/1
1 TABLET ORAL 2 TIMES DAILY
Qty: 60 TABLET | Refills: 11 | Status: SHIPPED | OUTPATIENT
Start: 2021-04-16 | End: 2022-05-24

## 2021-04-16 RX ORDER — LISINOPRIL AND HYDROCHLOROTHIAZIDE 12.5; 1 MG/1; MG/1
TABLET ORAL
COMMUNITY
Start: 2021-01-09 | End: 2021-04-16 | Stop reason: SDUPTHER

## 2021-04-16 NOTE — PROGRESS NOTES
The ABCs of the Annual Wellness Visit  Subsequent Medicare Wellness Visit    Chief Complaint   Patient presents with   • Medicare Wellness-subsequent       Subjective   History of Present Illness:  Primo Sommers is a 60 y.o. male who presents for a Subsequent Medicare Wellness Visit.    HEALTH RISK ASSESSMENT    Recent Hospitalizations:  Yes, [facility]    Current Medical Providers:  Patient Care Team:  Lindsay Fernandes MD as PCP - General (Family Medicine)    Smoking Status:  Social History     Tobacco Use   Smoking Status Current Every Day Smoker   Smokeless Tobacco Never Used       Alcohol Consumption:  Social History     Substance and Sexual Activity   Alcohol Use Not Currently       Depression Screen:   PHQ-2/PHQ-9 Depression Screening 4/16/2021   Little interest or pleasure in doing things 0   Feeling down, depressed, or hopeless 0   Trouble falling or staying asleep, or sleeping too much 0   Feeling tired or having little energy 0   Poor appetite or overeating 0   Feeling bad about yourself - or that you are a failure or have let yourself or your family down 0   Trouble concentrating on things, such as reading the newspaper or watching television 0   Moving or speaking so slowly that other people could have noticed. Or the opposite - being so fidgety or restless that you have been moving around a lot more than usual 0   Thoughts that you would be better off dead, or of hurting yourself in some way 0   Total Score 0   If you checked off any problems, how difficult have these problems made it for you to do your work, take care of things at home, or get along with other people? Not difficult at all       Fall Risk Screen:  STEADI Fall Risk Assessment was completed, and patient is at LOW risk for falls.Assessment completed on:4/16/2021    Health Habits and Functional and Cognitive Screening:  Functional & Cognitive Status 4/16/2021   Do you have difficulty preparing food and eating? No   Do you have  difficulty bathing yourself, getting dressed or grooming yourself? No   Do you have difficulty using the toilet? Yes   Do you have difficulty moving around from place to place? No   Do you have trouble with steps or getting out of a bed or a chair? No   Current Diet Unhealthy Diet   Dental Exam Not up to date   Eye Exam Not up to date   Exercise (times per week) 0 times per week   Current Exercises Include No Regular Exercise   Current Exercise Activities Include -   Do you need help using the phone?  No   Are you deaf or do you have serious difficulty hearing?  No   Do you need help with transportation? No   Do you need help shopping? No   Do you need help preparing meals?  No   Do you need help with housework?  No   Do you need help with laundry? No   Do you need help taking your medications? No   Do you need help managing money? No   Do you ever drive or ride in a car without wearing a seat belt? No   Have you felt unusual stress, anger or loneliness in the last month? No   Who do you live with? Other   If you need help, do you have trouble finding someone available to you? No   Have you been bothered in the last four weeks by sexual problems? -   Do you have difficulty concentrating, remembering or making decisions? No         Does the patient have evidence of cognitive impairment? No    Asprin use counseling:Does not need ASA (and currently is not on it)    Age-appropriate Screening Schedule:  Refer to the list below for future screening recommendations based on patient's age, sex and/or medical conditions. Orders for these recommended tests are listed in the plan section. The patient has been provided with a written plan.    Health Maintenance   Topic Date Due   • TDAP/TD VACCINES (1 - Tdap) Never done   • ZOSTER VACCINE (1 of 2) Never done   • INFLUENZA VACCINE  08/01/2021   • LIPID PANEL  11/12/2021          The following portions of the patient's history were reviewed and updated as appropriate: allergies,  current medications, past family history, past medical history, past social history, past surgical history and problem list.    Outpatient Medications Prior to Visit   Medication Sig Dispense Refill   • amLODIPine (NORVASC) 5 MG tablet Take 1 tablet by mouth Daily. 30 tablet 11   • atorvastatin (LIPITOR) 20 MG tablet Take 1 tablet by mouth Daily. 30 tablet 2   • cyclobenzaprine (FLEXERIL) 10 MG tablet cyclobenzaprine 10 mg tablet   Take 1 tablet every day by oral route as needed for 30 days.     • gabapentin (NEURONTIN) 100 MG capsule Every 8 (Eight) Hours.     • HYDROcodone-acetaminophen (NORCO) 7.5-325 MG per tablet      • amitriptyline (ELAVIL) 25 MG tablet amitriptyline 25 mg tablet   Take 1 tablet every day by oral route at bedtime for 30 days.     • lisinopril-hydrochlorothiazide (PRINZIDE,ZESTORETIC) 10-12.5 MG per tablet      • Methylnaltrexone Bromide (Relistor) 150 MG tablet Relistor 150 mg tablet   Take 3 tablets every day by oral route as needed for 30 days.     • Naloxegol Oxalate (Movantik) 25 MG tablet Movantik 25 mg tablet   Take 1 tablet every day by oral route as directed for 30 days.       No facility-administered medications prior to visit.       Patient Active Problem List   Diagnosis   • Medicare annual wellness visit, subsequent   • Essential hypertension   • Immunization due   • High risk medication use   • Hyperlipidemia   • Constipation   • Long-term current use of drug therapy for attention deficit hyperactivity disorder (ADHD)   • Spinal stenosis of lumbar region   • Chronic pain disorder   • Complication of surgical procedure   • Low back pain   • Herniation of intervertebral disc   • Low back pain   • Lumbar radiculopathy   • Myofascial pain   • Osteoarthritis of cervical spine with myelopathy   • Pain in both lower extremities   • Pseudoarthrosis of spine   • Status post lumbar surgery   • Tobacco use   • Urinary retention   • Urticaria   • Drug-induced constipation   • Hypertension  "      Advanced Care Planning:  ACP discussion was held with the patient during this visit. Patient does not have an advance directive, information provided.    Review of Systems   Constitutional: Negative.        Compared to one year ago, the patient feels his physical health is worse.  Compared to one year ago, the patient feels his mental health is the same.    Reviewed chart for potential of high risk medication in the elderly: yes  Reviewed chart for potential of harmful drug interactions in the elderly:yes    Objective         Vitals:    04/16/21 1021   BP: 151/82   Pulse: 76   Temp: 96.9 °F (36.1 °C)   SpO2: 97%   Weight: 69.4 kg (153 lb)   Height: 165.1 cm (65\")       Body mass index is 25.46 kg/m².  Discussed the patient's BMI with him. The BMI is above average; BMI management plan is completed.    Physical Exam  Vitals and nursing note reviewed.   Constitutional:       General: He is not in acute distress.     Appearance: He is well-developed. He is not diaphoretic.   Cardiovascular:      Rate and Rhythm: Normal rate and regular rhythm.   Pulmonary:      Effort: Pulmonary effort is normal. No respiratory distress.      Breath sounds: Normal breath sounds. No wheezing.               Assessment/Plan   Medicare Risks and Personalized Health Plan  CMS Preventative Services Quick Reference  Fall Risk    The above risks/problems have been discussed with the patient.  Pertinent information has been shared with the patient in the After Visit Summary.  Follow up plans and orders are seen below in the Assessment/Plan Section.    Diagnoses and all orders for this visit:    1. Medicare annual wellness visit, subsequent (Primary)    2. Essential hypertension  -     lisinopril-hydrochlorothiazide (PRINZIDE,ZESTORETIC) 10-12.5 MG per tablet; Take 1 tablet by mouth 2 (two) times a day.  Dispense: 60 tablet; Refill: 11  -     Comprehensive Metabolic Panel  -     Lipid Panel  -     CBC & Differential    3. Mixed " hyperlipidemia  -     Comprehensive Metabolic Panel  -     Lipid Panel  -     CBC & Differential    4. High risk medication use      Follow Up:  Return in about 1 year (around 4/16/2022) for Medicare Wellness.     An After Visit Summary and PPPS were given to the patient.             Answers for HPI/ROS submitted by the patient on 4/16/2021  What is the primary reason for your visit?: Physical

## 2021-04-17 LAB
ALBUMIN SERPL-MCNC: 4.7 G/DL (ref 3.5–5.2)
ALBUMIN/GLOB SERPL: 2 G/DL
ALP SERPL-CCNC: 154 U/L (ref 39–117)
ALT SERPL-CCNC: 7 U/L (ref 1–41)
AST SERPL-CCNC: 13 U/L (ref 1–40)
BASOPHILS # BLD AUTO: 0.07 10*3/MM3 (ref 0–0.2)
BASOPHILS NFR BLD AUTO: 0.7 % (ref 0–1.5)
BILIRUB SERPL-MCNC: 0.3 MG/DL (ref 0–1.2)
BUN SERPL-MCNC: 8 MG/DL (ref 6–20)
BUN/CREAT SERPL: 9.2 (ref 7–25)
CALCIUM SERPL-MCNC: 10.3 MG/DL (ref 8.6–10.5)
CHLORIDE SERPL-SCNC: 102 MMOL/L (ref 98–107)
CHOLEST SERPL-MCNC: 151 MG/DL (ref 0–200)
CO2 SERPL-SCNC: 30.8 MMOL/L (ref 22–29)
CREAT SERPL-MCNC: 0.87 MG/DL (ref 0.76–1.27)
EOSINOPHIL # BLD AUTO: 0.34 10*3/MM3 (ref 0–0.4)
EOSINOPHIL NFR BLD AUTO: 3.3 % (ref 0.3–6.2)
ERYTHROCYTE [DISTWIDTH] IN BLOOD BY AUTOMATED COUNT: 15.1 % (ref 12.3–15.4)
GLOBULIN SER CALC-MCNC: 2.4 GM/DL
GLUCOSE SERPL-MCNC: 73 MG/DL (ref 65–99)
HCT VFR BLD AUTO: 47.3 % (ref 37.5–51)
HDLC SERPL-MCNC: 38 MG/DL (ref 40–60)
HGB BLD-MCNC: 15.3 G/DL (ref 13–17.7)
IMM GRANULOCYTES # BLD AUTO: 0.03 10*3/MM3 (ref 0–0.05)
IMM GRANULOCYTES NFR BLD AUTO: 0.3 % (ref 0–0.5)
LDLC SERPL CALC-MCNC: 101 MG/DL (ref 0–100)
LYMPHOCYTES # BLD AUTO: 4.14 10*3/MM3 (ref 0.7–3.1)
LYMPHOCYTES NFR BLD AUTO: 40.2 % (ref 19.6–45.3)
MCH RBC QN AUTO: 27.8 PG (ref 26.6–33)
MCHC RBC AUTO-ENTMCNC: 32.3 G/DL (ref 31.5–35.7)
MCV RBC AUTO: 86 FL (ref 79–97)
MONOCYTES # BLD AUTO: 0.52 10*3/MM3 (ref 0.1–0.9)
MONOCYTES NFR BLD AUTO: 5.1 % (ref 5–12)
NEUTROPHILS # BLD AUTO: 5.19 10*3/MM3 (ref 1.7–7)
NEUTROPHILS NFR BLD AUTO: 50.4 % (ref 42.7–76)
NRBC BLD AUTO-RTO: 0 /100 WBC (ref 0–0.2)
PLATELET # BLD AUTO: 266 10*3/MM3 (ref 140–450)
POTASSIUM SERPL-SCNC: 4.3 MMOL/L (ref 3.5–5.2)
PROT SERPL-MCNC: 7.1 G/DL (ref 6–8.5)
RBC # BLD AUTO: 5.5 10*6/MM3 (ref 4.14–5.8)
SODIUM SERPL-SCNC: 140 MMOL/L (ref 136–145)
TRIGL SERPL-MCNC: 60 MG/DL (ref 0–150)
VLDLC SERPL CALC-MCNC: 12 MG/DL (ref 5–40)
WBC # BLD AUTO: 10.29 10*3/MM3 (ref 3.4–10.8)

## 2021-07-16 ENCOUNTER — OFFICE VISIT (OUTPATIENT)
Dept: FAMILY MEDICINE CLINIC | Facility: CLINIC | Age: 61
End: 2021-07-16

## 2021-07-16 VITALS
HEIGHT: 65 IN | BODY MASS INDEX: 24.16 KG/M2 | DIASTOLIC BLOOD PRESSURE: 75 MMHG | TEMPERATURE: 98 F | SYSTOLIC BLOOD PRESSURE: 115 MMHG | HEART RATE: 80 BPM | OXYGEN SATURATION: 97 % | WEIGHT: 145 LBS

## 2021-07-16 DIAGNOSIS — R63.4 WEIGHT LOSS: ICD-10-CM

## 2021-07-16 DIAGNOSIS — Z79.899 HIGH RISK MEDICATION USE: ICD-10-CM

## 2021-07-16 DIAGNOSIS — R21 RASH: ICD-10-CM

## 2021-07-16 DIAGNOSIS — I10 ESSENTIAL HYPERTENSION: Primary | ICD-10-CM

## 2021-07-16 DIAGNOSIS — L23.7 POISON IVY: ICD-10-CM

## 2021-07-16 DIAGNOSIS — E78.2 MIXED HYPERLIPIDEMIA: ICD-10-CM

## 2021-07-16 DIAGNOSIS — E55.9 VITAMIN D DEFICIENCY: ICD-10-CM

## 2021-07-16 DIAGNOSIS — R53.82 CHRONIC FATIGUE: ICD-10-CM

## 2021-07-16 PROCEDURE — 99214 OFFICE O/P EST MOD 30 MIN: CPT | Performed by: FAMILY MEDICINE

## 2021-07-16 RX ORDER — METHYLPREDNISOLONE 4 MG/1
TABLET ORAL
Qty: 21 EACH | Refills: 0 | Status: SHIPPED | OUTPATIENT
Start: 2021-07-16 | End: 2021-08-13

## 2021-07-16 NOTE — PROGRESS NOTES
Subjective   Primo Sommers is a 60 y.o. male.     Chief Complaint   Patient presents with   • Follow-up       History of Present Illness     Hypertension follow-up, hypotensive today.  Hyperlipidemia follow-up.  Patient has not been noticed to lose weight 10 pounds since last visit.  Chronic abdominal pain follow-up.  He is going for endoscopy in the next 2 weeks  He has been working outside in the last several days and complaining on bilateral upper extremity rash and itching  The following portions of the patient's history were reviewed and updated as appropriate: allergies, current medications, past family history, past medical history, past social history, past surgical history and problem list.    Past Medical History:   Diagnosis Date   • Advance directive discussed with patient    • Disc degeneration, lumbar    • High risk medication use    • Hyperglycemia    • Hyperlipidemia    • Hypertension    • Leukocytosis    • Medicare annual wellness visit, initial    • Medicare annual wellness visit, subsequent    • Spinal stenosis        Past Surgical History:   Procedure Laterality Date   • BACK SURGERY         Family History   Problem Relation Age of Onset   • Diabetes Mother    • Hyperlipidemia Mother    • Hypertension Mother    • Kidney disease Mother    • Heart disease Mother    • Hypertension Father    • Depression Brother        Social History     Socioeconomic History   • Marital status: Single     Spouse name: Not on file   • Number of children: Not on file   • Years of education: Not on file   • Highest education level: Not on file   Tobacco Use   • Smoking status: Current Every Day Smoker   • Smokeless tobacco: Never Used   Substance and Sexual Activity   • Alcohol use: Not Currently   • Drug use: Never   • Sexual activity: Yes       Current Outpatient Medications on File Prior to Visit   Medication Sig Dispense Refill   • atorvastatin (LIPITOR) 20 MG tablet Take 1 tablet by mouth Daily. 30 tablet 2    • gabapentin (NEURONTIN) 100 MG capsule Every 8 (Eight) Hours.     • HYDROcodone-acetaminophen (NORCO) 7.5-325 MG per tablet      • lisinopril-hydrochlorothiazide (PRINZIDE,ZESTORETIC) 10-12.5 MG per tablet Take 1 tablet by mouth 2 (two) times a day. 60 tablet 11   • [DISCONTINUED] amLODIPine (NORVASC) 5 MG tablet Take 1 tablet by mouth Daily. 30 tablet 11   • [DISCONTINUED] cyclobenzaprine (FLEXERIL) 10 MG tablet cyclobenzaprine 10 mg tablet   Take 1 tablet every day by oral route as needed for 30 days.       No current facility-administered medications on file prior to visit.       Review of Systems   Constitutional: Positive for unexpected weight loss.   Gastrointestinal: Positive for abdominal pain.   Musculoskeletal: Positive for back pain.   Skin: Positive for rash.       Recent Results (from the past 4704 hour(s))   Comprehensive Metabolic Panel    Collection Time: 04/16/21 11:20 AM    Specimen: Blood   Result Value Ref Range    Glucose 73 65 - 99 mg/dL    BUN 8 6 - 20 mg/dL    Creatinine 0.87 0.76 - 1.27 mg/dL    eGFR Non African Am 90 >60 mL/min/1.73    eGFR African Am 109 >60 mL/min/1.73    BUN/Creatinine Ratio 9.2 7.0 - 25.0    Sodium 140 136 - 145 mmol/L    Potassium 4.3 3.5 - 5.2 mmol/L    Chloride 102 98 - 107 mmol/L    Total CO2 30.8 (H) 22.0 - 29.0 mmol/L    Calcium 10.3 8.6 - 10.5 mg/dL    Total Protein 7.1 6.0 - 8.5 g/dL    Albumin 4.70 3.50 - 5.20 g/dL    Globulin 2.4 gm/dL    A/G Ratio 2.0 g/dL    Total Bilirubin 0.3 0.0 - 1.2 mg/dL    Alkaline Phosphatase 154 (H) 39 - 117 U/L    AST (SGOT) 13 1 - 40 U/L    ALT (SGPT) 7 1 - 41 U/L   Lipid Panel    Collection Time: 04/16/21 11:20 AM    Specimen: Blood   Result Value Ref Range    Total Cholesterol 151 0 - 200 mg/dL    Triglycerides 60 0 - 150 mg/dL    HDL Cholesterol 38 (L) 40 - 60 mg/dL    VLDL Cholesterol Jaime 12 5 - 40 mg/dL    LDL Chol Calc ([facility]) 101 (H) 0 - 100 mg/dL   CBC & Differential    Collection Time: 04/16/21 11:20 AM    Specimen:  "Blood   Result Value Ref Range    WBC 10.29 3.40 - 10.80 10*3/mm3    RBC 5.50 4.14 - 5.80 10*6/mm3    Hemoglobin 15.3 13.0 - 17.7 g/dL    Hematocrit 47.3 37.5 - 51.0 %    MCV 86.0 79.0 - 97.0 fL    MCH 27.8 26.6 - 33.0 pg    MCHC 32.3 31.5 - 35.7 g/dL    RDW 15.1 12.3 - 15.4 %    Platelets 266 140 - 450 10*3/mm3    Neutrophil Rel % 50.4 42.7 - 76.0 %    Lymphocyte Rel % 40.2 19.6 - 45.3 %    Monocyte Rel % 5.1 5.0 - 12.0 %    Eosinophil Rel % 3.3 0.3 - 6.2 %    Basophil Rel % 0.7 0.0 - 1.5 %    Neutrophils Absolute 5.19 1.70 - 7.00 10*3/mm3    Lymphocytes Absolute 4.14 (H) 0.70 - 3.10 10*3/mm3    Monocytes Absolute 0.52 0.10 - 0.90 10*3/mm3    Eosinophils Absolute 0.34 0.00 - 0.40 10*3/mm3    Basophils Absolute 0.07 0.00 - 0.20 10*3/mm3    Immature Granulocyte Rel % 0.3 0.0 - 0.5 %    Immature Grans Absolute 0.03 0.00 - 0.05 10*3/mm3    nRBC 0.0 0.0 - 0.2 /100 WBC     Objective   Vitals:    07/16/21 0808   BP: 115/75   Pulse: 80   Temp: 98 °F (36.7 °C)   SpO2: 97%   Weight: 65.8 kg (145 lb)   Height: 165.1 cm (65\")     Body mass index is 24.13 kg/m².  Physical Exam  Vitals and nursing note reviewed.   Constitutional:       General: He is not in acute distress.     Appearance: He is well-developed. He is not diaphoretic.   Cardiovascular:      Rate and Rhythm: Normal rate and regular rhythm.   Pulmonary:      Effort: Pulmonary effort is normal. No respiratory distress.      Breath sounds: Normal breath sounds. No wheezing.   Skin:     Comments: Rash, most likely poison ivy bilateral upper extremities           Diagnoses and all orders for this visit:    1. Essential hypertension (Primary)  Comments:  hypotensive  stop Amlodipine  Orders:  -     Comprehensive Metabolic Panel  -     CBC & Differential  -     Vitamin B12 & Folate  -     TSH  -     Vitamin D 25 Hydroxy    2. Mixed hyperlipidemia    3. High risk medication use    4. Weight loss  -     Comprehensive Metabolic Panel  -     CBC & Differential  -     Vitamin " B12 & Folate  -     TSH  -     Vitamin D 25 Hydroxy    5. Chronic fatigue  -     CBC & Differential  -     Vitamin B12 & Folate  -     TSH  -     Vitamin D 25 Hydroxy    6. Vitamin D deficiency  -     Vitamin D 25 Hydroxy    7. Rash  -     methylPREDNISolone (MEDROL) 4 MG dose pack; Take as directed on package instructions.  Dispense: 21 each; Refill: 0    8. Poison ivy  -     methylPREDNISolone (MEDROL) 4 MG dose pack; Take as directed on package instructions.  Dispense: 21 each; Refill: 0      Return in about 4 weeks (around 8/13/2021) for HYPERTENSION.          Answers for HPI/ROS submitted by the patient on 7/16/2021  What is the primary reason for your visit?: Physical

## 2021-07-17 LAB
25(OH)D3+25(OH)D2 SERPL-MCNC: 17.3 NG/ML (ref 30–100)
ALBUMIN SERPL-MCNC: 4.3 G/DL (ref 3.5–5.2)
ALBUMIN/GLOB SERPL: 1.6 G/DL
ALP SERPL-CCNC: 149 U/L (ref 39–117)
ALT SERPL-CCNC: 10 U/L (ref 1–41)
AST SERPL-CCNC: 14 U/L (ref 1–40)
BASOPHILS # BLD AUTO: 0.08 10*3/MM3 (ref 0–0.2)
BASOPHILS NFR BLD AUTO: 0.8 % (ref 0–1.5)
BILIRUB SERPL-MCNC: 0.3 MG/DL (ref 0–1.2)
BUN SERPL-MCNC: 5 MG/DL (ref 6–20)
BUN/CREAT SERPL: 6.2 (ref 7–25)
CALCIUM SERPL-MCNC: 10.1 MG/DL (ref 8.6–10.5)
CHLORIDE SERPL-SCNC: 106 MMOL/L (ref 98–107)
CO2 SERPL-SCNC: 27.8 MMOL/L (ref 22–29)
CREAT SERPL-MCNC: 0.81 MG/DL (ref 0.76–1.27)
EOSINOPHIL # BLD AUTO: 0.31 10*3/MM3 (ref 0–0.4)
EOSINOPHIL NFR BLD AUTO: 3.2 % (ref 0.3–6.2)
ERYTHROCYTE [DISTWIDTH] IN BLOOD BY AUTOMATED COUNT: 14.5 % (ref 12.3–15.4)
FOLATE SERPL-MCNC: 4.58 NG/ML (ref 4.78–24.2)
GLOBULIN SER CALC-MCNC: 2.7 GM/DL
GLUCOSE SERPL-MCNC: 72 MG/DL (ref 65–99)
HCT VFR BLD AUTO: 41.5 % (ref 37.5–51)
HGB BLD-MCNC: 13.8 G/DL (ref 13–17.7)
IMM GRANULOCYTES # BLD AUTO: 0.03 10*3/MM3 (ref 0–0.05)
IMM GRANULOCYTES NFR BLD AUTO: 0.3 % (ref 0–0.5)
LYMPHOCYTES # BLD AUTO: 3.88 10*3/MM3 (ref 0.7–3.1)
LYMPHOCYTES NFR BLD AUTO: 39.9 % (ref 19.6–45.3)
MCH RBC QN AUTO: 28.3 PG (ref 26.6–33)
MCHC RBC AUTO-ENTMCNC: 33.3 G/DL (ref 31.5–35.7)
MCV RBC AUTO: 85 FL (ref 79–97)
MONOCYTES # BLD AUTO: 0.37 10*3/MM3 (ref 0.1–0.9)
MONOCYTES NFR BLD AUTO: 3.8 % (ref 5–12)
NEUTROPHILS # BLD AUTO: 5.06 10*3/MM3 (ref 1.7–7)
NEUTROPHILS NFR BLD AUTO: 52 % (ref 42.7–76)
NRBC BLD AUTO-RTO: 0 /100 WBC (ref 0–0.2)
PLATELET # BLD AUTO: 235 10*3/MM3 (ref 140–450)
POTASSIUM SERPL-SCNC: 3.7 MMOL/L (ref 3.5–5.2)
PROT SERPL-MCNC: 7 G/DL (ref 6–8.5)
RBC # BLD AUTO: 4.88 10*6/MM3 (ref 4.14–5.8)
SODIUM SERPL-SCNC: 141 MMOL/L (ref 136–145)
TSH SERPL DL<=0.005 MIU/L-ACNC: 1.52 UIU/ML (ref 0.27–4.2)
VIT B12 SERPL-MCNC: 532 PG/ML (ref 211–946)
WBC # BLD AUTO: 9.73 10*3/MM3 (ref 3.4–10.8)

## 2021-07-19 DIAGNOSIS — E55.9 VITAMIN D DEFICIENCY: Primary | ICD-10-CM

## 2021-07-19 DIAGNOSIS — E53.8 FOLIC ACID DEFICIENCY: ICD-10-CM

## 2021-07-19 RX ORDER — CHOLECALCIFEROL (VITAMIN D3) 1250 MCG
50000 CAPSULE ORAL
Qty: 12 CAPSULE | Refills: 3 | Status: SHIPPED | OUTPATIENT
Start: 2021-07-19 | End: 2022-04-07

## 2021-07-19 RX ORDER — FOLIC ACID 1 MG/1
1 TABLET ORAL DAILY
Qty: 30 TABLET | Refills: 11 | Status: SHIPPED | OUTPATIENT
Start: 2021-07-19 | End: 2022-08-22 | Stop reason: SDUPTHER

## 2021-07-21 ENCOUNTER — IMMUNIZATION (OUTPATIENT)
Dept: VACCINE CLINIC | Facility: HOSPITAL | Age: 61
End: 2021-07-21

## 2021-07-21 PROCEDURE — 91300 HC SARSCOV02 VAC 30MCG/0.3ML IM: CPT | Performed by: INTERNAL MEDICINE

## 2021-07-21 PROCEDURE — 0001A: CPT | Performed by: INTERNAL MEDICINE

## 2021-08-09 RX ORDER — ATORVASTATIN CALCIUM 20 MG/1
TABLET, FILM COATED ORAL
Qty: 90 TABLET | Refills: 1 | Status: SHIPPED | OUTPATIENT
Start: 2021-08-09 | End: 2022-01-31

## 2021-08-11 ENCOUNTER — IMMUNIZATION (OUTPATIENT)
Dept: VACCINE CLINIC | Facility: HOSPITAL | Age: 61
End: 2021-08-11

## 2021-08-11 PROCEDURE — 0002A: CPT | Performed by: INTERNAL MEDICINE

## 2021-08-11 PROCEDURE — 91300 HC SARSCOV02 VAC 30MCG/0.3ML IM: CPT | Performed by: INTERNAL MEDICINE

## 2021-08-13 ENCOUNTER — OFFICE VISIT (OUTPATIENT)
Dept: FAMILY MEDICINE CLINIC | Facility: CLINIC | Age: 61
End: 2021-08-13

## 2021-08-13 VITALS
OXYGEN SATURATION: 98 % | WEIGHT: 148 LBS | HEART RATE: 85 BPM | HEIGHT: 65 IN | BODY MASS INDEX: 24.66 KG/M2 | TEMPERATURE: 97.3 F | DIASTOLIC BLOOD PRESSURE: 82 MMHG | SYSTOLIC BLOOD PRESSURE: 139 MMHG

## 2021-08-13 DIAGNOSIS — I10 ESSENTIAL HYPERTENSION: Primary | ICD-10-CM

## 2021-08-13 DIAGNOSIS — E53.8 FOLATE DEFICIENCY: ICD-10-CM

## 2021-08-13 DIAGNOSIS — E55.9 VITAMIN D DEFICIENCY: ICD-10-CM

## 2021-08-13 PROBLEM — Z79.4 ENCOUNTER FOR LONG-TERM (CURRENT) USE OF INSULIN (HCC): Status: ACTIVE | Noted: 2020-01-27

## 2021-08-13 PROCEDURE — 99213 OFFICE O/P EST LOW 20 MIN: CPT | Performed by: FAMILY MEDICINE

## 2021-08-13 RX ORDER — LACTULOSE 10 G/15ML
SOLUTION ORAL
COMMUNITY
Start: 2021-07-27

## 2021-08-13 NOTE — PROGRESS NOTES
Subjective  Answers for HPI/ROS submitted by the patient on 8/9/2021  What is the primary reason for your visit?: Other  Please describe your symptoms.: check up follow up  Have you had these symptoms before?: Yes  How long have you been having these symptoms?: Greater than 2 weeks      Primo Sommers is a 60 y.o. male.     Chief Complaint   Patient presents with   • Follow-up   • Hypertension       History of Present Illness   Hypertension follow-up.  Stable.  Continue current management.  Vitamin D deficiency follow-up today.  Continue supplements.  Folic acid deficiency follow-up.  Continue supplements    The following portions of the patient's history were reviewed and updated as appropriate: allergies, current medications, past family history, past medical history, past social history, past surgical history and problem list.    Past Medical History:   Diagnosis Date   • Advance directive discussed with patient    • Disc degeneration, lumbar    • High risk medication use    • Hyperglycemia    • Hyperlipidemia    • Hypertension    • Leukocytosis    • Medicare annual wellness visit, initial    • Medicare annual wellness visit, subsequent    • Spinal stenosis        Past Surgical History:   Procedure Laterality Date   • BACK SURGERY         Family History   Problem Relation Age of Onset   • Diabetes Mother    • Hyperlipidemia Mother    • Hypertension Mother    • Kidney disease Mother    • Heart disease Mother    • Hypertension Father    • Depression Brother        Social History     Socioeconomic History   • Marital status: Single     Spouse name: Not on file   • Number of children: Not on file   • Years of education: Not on file   • Highest education level: Not on file   Tobacco Use   • Smoking status: Current Every Day Smoker   • Smokeless tobacco: Never Used   Substance and Sexual Activity   • Alcohol use: Not Currently   • Drug use: Never   • Sexual activity: Yes       Current Outpatient Medications on  File Prior to Visit   Medication Sig Dispense Refill   • atorvastatin (LIPITOR) 20 MG tablet TAKE ONE TABLET BY MOUTH DAILY 90 tablet 1   • Cholecalciferol (Vitamin D3) 1.25 MG (13642 UT) capsule Take 1 capsule by mouth Every 7 (Seven) Days. 12 capsule 3   • folic acid (FOLVITE) 1 MG tablet Take 1 tablet by mouth Daily. 30 tablet 11   • FOLIC ACID PO folic acid   QD     • gabapentin (NEURONTIN) 100 MG capsule Every 8 (Eight) Hours.     • HYDROcodone-acetaminophen (NORCO) 7.5-325 MG per tablet      • lactulose (CHRONULAC) 10 GM/15ML solution lactulose 10 gram/15 mL oral solution     • lisinopril-hydrochlorothiazide (PRINZIDE,ZESTORETIC) 10-12.5 MG per tablet Take 1 tablet by mouth 2 (two) times a day. 60 tablet 11   • [DISCONTINUED] methylPREDNISolone (MEDROL) 4 MG dose pack Take as directed on package instructions. 21 each 0     No current facility-administered medications on file prior to visit.       Review of Systems   Gastrointestinal: Positive for constipation.   Musculoskeletal: Positive for back pain.       Recent Results (from the past 4704 hour(s))   Comprehensive Metabolic Panel    Collection Time: 04/16/21 11:20 AM    Specimen: Blood   Result Value Ref Range    Glucose 73 65 - 99 mg/dL    BUN 8 6 - 20 mg/dL    Creatinine 0.87 0.76 - 1.27 mg/dL    eGFR Non African Am 90 >60 mL/min/1.73    eGFR African Am 109 >60 mL/min/1.73    BUN/Creatinine Ratio 9.2 7.0 - 25.0    Sodium 140 136 - 145 mmol/L    Potassium 4.3 3.5 - 5.2 mmol/L    Chloride 102 98 - 107 mmol/L    Total CO2 30.8 (H) 22.0 - 29.0 mmol/L    Calcium 10.3 8.6 - 10.5 mg/dL    Total Protein 7.1 6.0 - 8.5 g/dL    Albumin 4.70 3.50 - 5.20 g/dL    Globulin 2.4 gm/dL    A/G Ratio 2.0 g/dL    Total Bilirubin 0.3 0.0 - 1.2 mg/dL    Alkaline Phosphatase 154 (H) 39 - 117 U/L    AST (SGOT) 13 1 - 40 U/L    ALT (SGPT) 7 1 - 41 U/L   Lipid Panel    Collection Time: 04/16/21 11:20 AM    Specimen: Blood   Result Value Ref Range    Total Cholesterol 151 0 - 200  mg/dL    Triglycerides 60 0 - 150 mg/dL    HDL Cholesterol 38 (L) 40 - 60 mg/dL    VLDL Cholesterol Jaime 12 5 - 40 mg/dL    LDL Chol Calc ([facility]) 101 (H) 0 - 100 mg/dL   CBC & Differential    Collection Time: 04/16/21 11:20 AM    Specimen: Blood   Result Value Ref Range    WBC 10.29 3.40 - 10.80 10*3/mm3    RBC 5.50 4.14 - 5.80 10*6/mm3    Hemoglobin 15.3 13.0 - 17.7 g/dL    Hematocrit 47.3 37.5 - 51.0 %    MCV 86.0 79.0 - 97.0 fL    MCH 27.8 26.6 - 33.0 pg    MCHC 32.3 31.5 - 35.7 g/dL    RDW 15.1 12.3 - 15.4 %    Platelets 266 140 - 450 10*3/mm3    Neutrophil Rel % 50.4 42.7 - 76.0 %    Lymphocyte Rel % 40.2 19.6 - 45.3 %    Monocyte Rel % 5.1 5.0 - 12.0 %    Eosinophil Rel % 3.3 0.3 - 6.2 %    Basophil Rel % 0.7 0.0 - 1.5 %    Neutrophils Absolute 5.19 1.70 - 7.00 10*3/mm3    Lymphocytes Absolute 4.14 (H) 0.70 - 3.10 10*3/mm3    Monocytes Absolute 0.52 0.10 - 0.90 10*3/mm3    Eosinophils Absolute 0.34 0.00 - 0.40 10*3/mm3    Basophils Absolute 0.07 0.00 - 0.20 10*3/mm3    Immature Granulocyte Rel % 0.3 0.0 - 0.5 %    Immature Grans Absolute 0.03 0.00 - 0.05 10*3/mm3    nRBC 0.0 0.0 - 0.2 /100 WBC   Comprehensive Metabolic Panel    Collection Time: 07/16/21  9:09 AM    Specimen: Blood   Result Value Ref Range    Glucose 72 65 - 99 mg/dL    BUN 5 (L) 6 - 20 mg/dL    Creatinine 0.81 0.76 - 1.27 mg/dL    eGFR Non African Am 98 >60 mL/min/1.73    eGFR African Am 119 >60 mL/min/1.73    BUN/Creatinine Ratio 6.2 (L) 7.0 - 25.0    Sodium 141 136 - 145 mmol/L    Potassium 3.7 3.5 - 5.2 mmol/L    Chloride 106 98 - 107 mmol/L    Total CO2 27.8 22.0 - 29.0 mmol/L    Calcium 10.1 8.6 - 10.5 mg/dL    Total Protein 7.0 6.0 - 8.5 g/dL    Albumin 4.30 3.50 - 5.20 g/dL    Globulin 2.7 gm/dL    A/G Ratio 1.6 g/dL    Total Bilirubin 0.3 0.0 - 1.2 mg/dL    Alkaline Phosphatase 149 (H) 39 - 117 U/L    AST (SGOT) 14 1 - 40 U/L    ALT (SGPT) 10 1 - 41 U/L   CBC & Differential    Collection Time: 07/16/21  9:09 AM    Specimen: Blood   Result  "Value Ref Range    WBC 9.73 3.40 - 10.80 10*3/mm3    RBC 4.88 4.14 - 5.80 10*6/mm3    Hemoglobin 13.8 13.0 - 17.7 g/dL    Hematocrit 41.5 37.5 - 51.0 %    MCV 85.0 79.0 - 97.0 fL    MCH 28.3 26.6 - 33.0 pg    MCHC 33.3 31.5 - 35.7 g/dL    RDW 14.5 12.3 - 15.4 %    Platelets 235 140 - 450 10*3/mm3    Neutrophil Rel % 52.0 42.7 - 76.0 %    Lymphocyte Rel % 39.9 19.6 - 45.3 %    Monocyte Rel % 3.8 (L) 5.0 - 12.0 %    Eosinophil Rel % 3.2 0.3 - 6.2 %    Basophil Rel % 0.8 0.0 - 1.5 %    Neutrophils Absolute 5.06 1.70 - 7.00 10*3/mm3    Lymphocytes Absolute 3.88 (H) 0.70 - 3.10 10*3/mm3    Monocytes Absolute 0.37 0.10 - 0.90 10*3/mm3    Eosinophils Absolute 0.31 0.00 - 0.40 10*3/mm3    Basophils Absolute 0.08 0.00 - 0.20 10*3/mm3    Immature Granulocyte Rel % 0.3 0.0 - 0.5 %    Immature Grans Absolute 0.03 0.00 - 0.05 10*3/mm3    nRBC 0.0 0.0 - 0.2 /100 WBC   Vitamin B12 & Folate    Collection Time: 07/16/21  9:09 AM    Specimen: Blood   Result Value Ref Range    Vitamin B-12 532 211 - 946 pg/mL    Folate 4.58 (L) 4.78 - 24.20 ng/mL   TSH    Collection Time: 07/16/21  9:09 AM    Specimen: Blood   Result Value Ref Range    TSH 1.520 0.270 - 4.200 uIU/mL   Vitamin D 25 Hydroxy    Collection Time: 07/16/21  9:09 AM    Specimen: Blood   Result Value Ref Range    25 Hydroxy, Vitamin D 17.3 (L) 30.0 - 100.0 ng/ml     Objective   Vitals:    08/13/21 0822 08/13/21 0825   BP: 142/93 139/82   Pulse: 85    Temp: 97.3 °F (36.3 °C)    SpO2: 98%    Weight: 67.1 kg (148 lb)    Height: 165.1 cm (65\")      Body mass index is 24.63 kg/m².  Physical Exam  Vitals and nursing note reviewed.   Constitutional:       General: He is not in acute distress.     Appearance: He is well-developed. He is not diaphoretic.   Cardiovascular:      Rate and Rhythm: Normal rate and regular rhythm.   Pulmonary:      Effort: Pulmonary effort is normal. No respiratory distress.      Breath sounds: Normal breath sounds. No wheezing.           Diagnoses and all " orders for this visit:    1. Essential hypertension (Primary)  Comments:  improving    2. Vitamin D deficiency    3. Folate deficiency          Hypertension follow-up.  Stable.  Continue current management.  Vitamin D deficiency follow-up today.  Continue supplements.  Folic acid deficiency follow-up.  Continue supplements    Return in about 3 months (around 11/13/2021).

## 2021-11-12 ENCOUNTER — OFFICE VISIT (OUTPATIENT)
Dept: FAMILY MEDICINE CLINIC | Facility: CLINIC | Age: 61
End: 2021-11-12

## 2021-11-12 VITALS
SYSTOLIC BLOOD PRESSURE: 147 MMHG | HEART RATE: 68 BPM | TEMPERATURE: 97.3 F | DIASTOLIC BLOOD PRESSURE: 87 MMHG | BODY MASS INDEX: 24.83 KG/M2 | WEIGHT: 149 LBS | OXYGEN SATURATION: 95 % | HEIGHT: 65 IN

## 2021-11-12 DIAGNOSIS — E53.8 FOLATE DEFICIENCY: ICD-10-CM

## 2021-11-12 DIAGNOSIS — G89.29 CHRONIC BILATERAL LOW BACK PAIN WITH BILATERAL SCIATICA: ICD-10-CM

## 2021-11-12 DIAGNOSIS — B30.9 ACUTE VIRAL CONJUNCTIVITIS OF LEFT EYE: ICD-10-CM

## 2021-11-12 DIAGNOSIS — I10 ESSENTIAL HYPERTENSION: Primary | ICD-10-CM

## 2021-11-12 DIAGNOSIS — E55.9 VITAMIN D DEFICIENCY: ICD-10-CM

## 2021-11-12 DIAGNOSIS — M54.41 CHRONIC BILATERAL LOW BACK PAIN WITH BILATERAL SCIATICA: ICD-10-CM

## 2021-11-12 DIAGNOSIS — M54.42 CHRONIC BILATERAL LOW BACK PAIN WITH BILATERAL SCIATICA: ICD-10-CM

## 2021-11-12 PROCEDURE — 99214 OFFICE O/P EST MOD 30 MIN: CPT | Performed by: FAMILY MEDICINE

## 2021-11-12 PROCEDURE — 96372 THER/PROPH/DIAG INJ SC/IM: CPT | Performed by: FAMILY MEDICINE

## 2021-11-12 RX ORDER — HYDROCODONE BITARTRATE AND ACETAMINOPHEN 10; 325 MG/1; MG/1
TABLET ORAL
COMMUNITY
End: 2022-04-07

## 2021-11-12 RX ORDER — AMLODIPINE BESYLATE 5 MG/1
TABLET ORAL
COMMUNITY
Start: 2021-11-09 | End: 2022-05-24

## 2021-11-12 RX ORDER — KETOROLAC TROMETHAMINE 30 MG/ML
30 INJECTION, SOLUTION INTRAMUSCULAR; INTRAVENOUS EVERY 6 HOURS PRN
Status: SHIPPED | OUTPATIENT
Start: 2021-11-12 | End: 2021-11-17

## 2021-11-12 RX ORDER — OFLOXACIN 3 MG/ML
1 SOLUTION/ DROPS OPHTHALMIC 4 TIMES DAILY
Qty: 10 ML | Refills: 1 | Status: SHIPPED | OUTPATIENT
Start: 2021-11-12 | End: 2022-08-22

## 2021-11-12 RX ADMIN — KETOROLAC TROMETHAMINE 30 MG: 30 INJECTION, SOLUTION INTRAMUSCULAR; INTRAVENOUS at 09:12

## 2021-11-12 NOTE — PROGRESS NOTES
Subjective   Primo Sommers is a 60 y.o. male.     Chief Complaint   Patient presents with   • Follow-up   • Hypertension       History of Present Illness   Follow-up on hypertension.  He has been borderline elevated blood pressure today but he is in significant amount of pain with his chronic low back pain despite having surgery.  Hyperlipidemia follow-up stable.  Patient is complaining on left eye drainage and he continues to have infection of the left eye despite of course of antibiotics that he was given by urgent care on October 26.  Not getting much better  Follow-up on vitamin D deficiency he is on supplements.  Labs today.  Follow-up in folic acid vitamin deficiency, he is on supplements.  Labs today  The following portions of the patient's history were reviewed and updated as appropriate: allergies, current medications, past family history, past medical history, past social history, past surgical history and problem list.    Past Medical History:   Diagnosis Date   • Advance directive discussed with patient    • Disc degeneration, lumbar    • High risk medication use    • Hyperglycemia    • Hyperlipidemia    • Hypertension    • Leukocytosis    • Medicare annual wellness visit, initial    • Medicare annual wellness visit, subsequent    • Spinal stenosis        Past Surgical History:   Procedure Laterality Date   • BACK SURGERY         Family History   Problem Relation Age of Onset   • Diabetes Mother    • Hyperlipidemia Mother    • Hypertension Mother    • Kidney disease Mother    • Heart disease Mother    • Hypertension Father    • Depression Brother        Social History     Socioeconomic History   • Marital status: Single   Tobacco Use   • Smoking status: Current Every Day Smoker   • Smokeless tobacco: Never Used   Substance and Sexual Activity   • Alcohol use: Not Currently   • Drug use: Never   • Sexual activity: Yes       Current Outpatient Medications on File Prior to Visit   Medication Sig  Dispense Refill   • amLODIPine (NORVASC) 5 MG tablet      • atorvastatin (LIPITOR) 20 MG tablet TAKE ONE TABLET BY MOUTH DAILY 90 tablet 1   • Cholecalciferol (Vitamin D3) 1.25 MG (92439 UT) capsule Take 1 capsule by mouth Every 7 (Seven) Days. 12 capsule 3   • folic acid (FOLVITE) 1 MG tablet Take 1 tablet by mouth Daily. 30 tablet 11   • gabapentin (NEURONTIN) 100 MG capsule Every 8 (Eight) Hours.     • HYDROcodone-acetaminophen (NORCO)  MG per tablet hydrocodone 10 mg-acetaminophen 325 mg tablet     • lactulose (CHRONULAC) 10 GM/15ML solution lactulose 10 gram/15 mL oral solution     • lisinopril-hydrochlorothiazide (PRINZIDE,ZESTORETIC) 10-12.5 MG per tablet Take 1 tablet by mouth 2 (two) times a day. 60 tablet 11   • [DISCONTINUED] FOLIC ACID PO folic acid   QD     • [DISCONTINUED] HYDROcodone-acetaminophen (NORCO) 7.5-325 MG per tablet        No current facility-administered medications on file prior to visit.       Review of Systems   HENT:        Left eye drainage   Gastrointestinal: Positive for abdominal pain.   Genitourinary: Positive for urinary incontinence.   Musculoskeletal: Positive for back pain.   Neurological: Positive for weakness and numbness.       Recent Results (from the past 4704 hour(s))   Comprehensive Metabolic Panel    Collection Time: 07/16/21  9:09 AM    Specimen: Blood   Result Value Ref Range    Glucose 72 65 - 99 mg/dL    BUN 5 (L) 6 - 20 mg/dL    Creatinine 0.81 0.76 - 1.27 mg/dL    eGFR Non African Am 98 >60 mL/min/1.73    eGFR African Am 119 >60 mL/min/1.73    BUN/Creatinine Ratio 6.2 (L) 7.0 - 25.0    Sodium 141 136 - 145 mmol/L    Potassium 3.7 3.5 - 5.2 mmol/L    Chloride 106 98 - 107 mmol/L    Total CO2 27.8 22.0 - 29.0 mmol/L    Calcium 10.1 8.6 - 10.5 mg/dL    Total Protein 7.0 6.0 - 8.5 g/dL    Albumin 4.30 3.50 - 5.20 g/dL    Globulin 2.7 gm/dL    A/G Ratio 1.6 g/dL    Total Bilirubin 0.3 0.0 - 1.2 mg/dL    Alkaline Phosphatase 149 (H) 39 - 117 U/L    AST (SGOT) 14  "1 - 40 U/L    ALT (SGPT) 10 1 - 41 U/L   CBC & Differential    Collection Time: 07/16/21  9:09 AM    Specimen: Blood   Result Value Ref Range    WBC 9.73 3.40 - 10.80 10*3/mm3    RBC 4.88 4.14 - 5.80 10*6/mm3    Hemoglobin 13.8 13.0 - 17.7 g/dL    Hematocrit 41.5 37.5 - 51.0 %    MCV 85.0 79.0 - 97.0 fL    MCH 28.3 26.6 - 33.0 pg    MCHC 33.3 31.5 - 35.7 g/dL    RDW 14.5 12.3 - 15.4 %    Platelets 235 140 - 450 10*3/mm3    Neutrophil Rel % 52.0 42.7 - 76.0 %    Lymphocyte Rel % 39.9 19.6 - 45.3 %    Monocyte Rel % 3.8 (L) 5.0 - 12.0 %    Eosinophil Rel % 3.2 0.3 - 6.2 %    Basophil Rel % 0.8 0.0 - 1.5 %    Neutrophils Absolute 5.06 1.70 - 7.00 10*3/mm3    Lymphocytes Absolute 3.88 (H) 0.70 - 3.10 10*3/mm3    Monocytes Absolute 0.37 0.10 - 0.90 10*3/mm3    Eosinophils Absolute 0.31 0.00 - 0.40 10*3/mm3    Basophils Absolute 0.08 0.00 - 0.20 10*3/mm3    Immature Granulocyte Rel % 0.3 0.0 - 0.5 %    Immature Grans Absolute 0.03 0.00 - 0.05 10*3/mm3    nRBC 0.0 0.0 - 0.2 /100 WBC   Vitamin B12 & Folate    Collection Time: 07/16/21  9:09 AM    Specimen: Blood   Result Value Ref Range    Vitamin B-12 532 211 - 946 pg/mL    Folate 4.58 (L) 4.78 - 24.20 ng/mL   TSH    Collection Time: 07/16/21  9:09 AM    Specimen: Blood   Result Value Ref Range    TSH 1.520 0.270 - 4.200 uIU/mL   Vitamin D 25 Hydroxy    Collection Time: 07/16/21  9:09 AM    Specimen: Blood   Result Value Ref Range    25 Hydroxy, Vitamin D 17.3 (L) 30.0 - 100.0 ng/ml     Objective   Vitals:    11/12/21 0823   BP: 147/87   Pulse: 68   Temp: 97.3 °F (36.3 °C)   SpO2: 95%   Weight: 67.6 kg (149 lb)   Height: 165.1 cm (65\")     Body mass index is 24.79 kg/m².  Physical Exam  Vitals and nursing note reviewed.   Constitutional:       General: He is not in acute distress.     Appearance: He is well-developed. He is not diaphoretic.   Eyes:      Comments: Left conjunctival injection and purulent drainage   Cardiovascular:      Rate and Rhythm: Normal rate and " regular rhythm.   Pulmonary:      Effort: Pulmonary effort is normal. No respiratory distress.      Breath sounds: Normal breath sounds. No wheezing.           Diagnoses and all orders for this visit:    1. Essential hypertension (Primary)    2. Acute viral conjunctivitis of left eye  -     ofloxacin (Ocuflox) 0.3 % ophthalmic solution; Administer 1 drop into the left eye 4 (Four) Times a Day.  Dispense: 10 mL; Refill: 1    3. Chronic bilateral low back pain with bilateral sciatica  -     ketorolac (TORADOL) injection 30 mg    4. Vitamin D deficiency  -     Vitamin D 25 Hydroxy    5. Folate deficiency  -     Vitamin B12 & Folate    Otherwise continue all chronic current medications for stable chronic medical conditions  Return in about 3 months (around 2/12/2022) for HYPERTENSION.          Answers for HPI/ROS submitted by the patient on 11/12/2021  What is the primary reason for your visit?: Back Pain      Answers for HPI/ROS submitted by the patient on 11/12/2021  What is the primary reason for your visit?: Back Pain

## 2021-11-13 LAB
25(OH)D3+25(OH)D2 SERPL-MCNC: 97.5 NG/ML (ref 30–100)
FOLATE SERPL-MCNC: >20 NG/ML
VIT B12 SERPL-MCNC: 513 PG/ML (ref 232–1245)

## 2022-01-31 RX ORDER — ATORVASTATIN CALCIUM 20 MG/1
TABLET, FILM COATED ORAL
Qty: 90 TABLET | Refills: 1 | Status: SHIPPED | OUTPATIENT
Start: 2022-01-31 | End: 2022-08-22 | Stop reason: SDUPTHER

## 2022-01-31 NOTE — TELEPHONE ENCOUNTER
Rx Refill Note  Requested Prescriptions     Pending Prescriptions Disp Refills   • atorvastatin (LIPITOR) 20 MG tablet [Pharmacy Med Name: ATORVASTATIN 20 MG TABLET] 90 tablet 1     Sig: TAKE ONE TABLET BY MOUTH DAILY      Last office visit with prescribing clinician: 11/12/2021      Next office visit with prescribing clinician: 2/7/2022   Last filled 8/9/2021           Margareth Krause MA  01/31/22, 12:39 EST

## 2022-02-07 ENCOUNTER — OFFICE VISIT (OUTPATIENT)
Dept: FAMILY MEDICINE CLINIC | Facility: CLINIC | Age: 62
End: 2022-02-07

## 2022-02-07 VITALS
WEIGHT: 149 LBS | SYSTOLIC BLOOD PRESSURE: 103 MMHG | HEART RATE: 72 BPM | TEMPERATURE: 98.2 F | OXYGEN SATURATION: 95 % | DIASTOLIC BLOOD PRESSURE: 70 MMHG | BODY MASS INDEX: 24.83 KG/M2 | HEIGHT: 65 IN

## 2022-02-07 DIAGNOSIS — S09.8XXA BLUNT HEAD TRAUMA, INITIAL ENCOUNTER: ICD-10-CM

## 2022-02-07 DIAGNOSIS — E78.2 MIXED HYPERLIPIDEMIA: ICD-10-CM

## 2022-02-07 DIAGNOSIS — E55.9 VITAMIN D DEFICIENCY: ICD-10-CM
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
## 2022-02-07 DIAGNOSIS — Z79.899 HIGH RISK MEDICATION USE: ICD-10-CM

## 2022-02-07 DIAGNOSIS — I10 ESSENTIAL HYPERTENSION: ICD-10-CM

## 2022-02-07 DIAGNOSIS — Z23 IMMUNIZATION DUE: Primary | ICD-10-CM

## 2022-02-07 PROCEDURE — 91300 COVID-19 (PFIZER): CPT | Performed by: FAMILY MEDICINE

## 2022-02-07 PROCEDURE — G0008 ADMIN INFLUENZA VIRUS VAC: HCPCS | Performed by: FAMILY MEDICINE

## 2022-02-07 PROCEDURE — 99214 OFFICE O/P EST MOD 30 MIN: CPT | Performed by: FAMILY MEDICINE

## 2022-02-07 PROCEDURE — 90686 IIV4 VACC NO PRSV 0.5 ML IM: CPT | Performed by: FAMILY MEDICINE

## 2022-02-07 PROCEDURE — 0002A COVID-19 (PFIZER): CPT | Performed by: FAMILY MEDICINE

## 2022-02-07 NOTE — PROGRESS NOTES
"Subjective   Primo Sommers is a 61 y.o. male.     Chief Complaint   Patient presents with   • Hypertension   • Hyperlipidemia       History of Present Illness   Follow-up on hypertension.  Stable on current medications.  Follow-up on hyperlipidemia.  Stable current medications.  Labs today.  Patient reported fall due to \"legs give out\" several weeks ago.  Sustained blunt head trauma.  At this time his \"bump\" resolved and he does not have any residual headaches    The following portions of the patient's history were reviewed and updated as appropriate: allergies, current medications, past family history, past medical history, past social history, past surgical history and problem list.    Past Medical History:   Diagnosis Date   • Advance directive discussed with patient    • Disc degeneration, lumbar    • High risk medication use    • Hyperglycemia    • Hyperlipidemia    • Hypertension    • Leukocytosis    • Medicare annual wellness visit, initial    • Medicare annual wellness visit, subsequent    • Spinal stenosis        Past Surgical History:   Procedure Laterality Date   • BACK SURGERY         Family History   Problem Relation Age of Onset   • Diabetes Mother    • Hyperlipidemia Mother    • Hypertension Mother    • Kidney disease Mother    • Heart disease Mother    • Hypertension Father    • Depression Brother        Social History     Socioeconomic History   • Marital status: Single   Tobacco Use   • Smoking status: Current Every Day Smoker   • Smokeless tobacco: Never Used   Substance and Sexual Activity   • Alcohol use: Not Currently   • Drug use: Never   • Sexual activity: Yes       Current Outpatient Medications on File Prior to Visit   Medication Sig Dispense Refill   • amLODIPine (NORVASC) 5 MG tablet      • atorvastatin (LIPITOR) 20 MG tablet TAKE ONE TABLET BY MOUTH DAILY 90 tablet 1   • Cholecalciferol (Vitamin D3) 1.25 MG (84642 UT) capsule Take 1 capsule by mouth Every 7 (Seven) Days. 12 capsule " "3   • folic acid (FOLVITE) 1 MG tablet Take 1 tablet by mouth Daily. 30 tablet 11   • gabapentin (NEURONTIN) 100 MG capsule Every 8 (Eight) Hours.     • HYDROcodone-acetaminophen (NORCO)  MG per tablet hydrocodone 10 mg-acetaminophen 325 mg tablet     • lactulose (CHRONULAC) 10 GM/15ML solution lactulose 10 gram/15 mL oral solution     • lisinopril-hydrochlorothiazide (PRINZIDE,ZESTORETIC) 10-12.5 MG per tablet Take 1 tablet by mouth 2 (two) times a day. 60 tablet 11   • ofloxacin (Ocuflox) 0.3 % ophthalmic solution Administer 1 drop into the left eye 4 (Four) Times a Day. 10 mL 1     No current facility-administered medications on file prior to visit.       Review of Systems   Constitutional: Negative.        Recent Results (from the past 4704 hour(s))   Vitamin B12 & Folate    Collection Time: 11/12/21  9:20 AM    Specimen: Blood   Result Value Ref Range    Vitamin B-12 513 232 - 1,245 pg/mL    Folate >20.0 >3.0 ng/mL   Vitamin D 25 Hydroxy    Collection Time: 11/12/21  9:20 AM    Specimen: Blood   Result Value Ref Range    25 Hydroxy, Vitamin D 97.5 30.0 - 100.0 ng/mL     Objective   Vitals:    02/07/22 0927   BP: 103/70   BP Location: Right arm   Patient Position: Sitting   Pulse: 72   Temp: 98.2 °F (36.8 °C)   SpO2: 95%   Weight: 67.6 kg (149 lb)   Height: 165.1 cm (65\")     Body mass index is 24.79 kg/m².  Physical Exam  Vitals and nursing note reviewed.   Constitutional:       General: He is not in acute distress.     Appearance: He is well-developed. He is not diaphoretic.   Cardiovascular:      Rate and Rhythm: Normal rate and regular rhythm.   Pulmonary:      Effort: Pulmonary effort is normal. No respiratory distress.      Breath sounds: Normal breath sounds. No wheezing.   Skin:     Comments: Small abrasion on the occipital area of the scalp which is healing laceration no active bleeding           Diagnoses and all orders for this visit:    1. Immunization due (Primary)  -     COVID-19 Vaccine " (Pfizer) Purple Cap  -     FluLaval/Fluarix/Fluzone >6 Months (0580-2196)    2. Essential hypertension  -     Comprehensive Metabolic Panel  -     Lipid Panel    3. Mixed hyperlipidemia  -     Comprehensive Metabolic Panel  -     Lipid Panel    4. High risk medication use    5. Vitamin D deficiency    6. Fall, initial encounter    7. Blunt head trauma, initial encounter    Continue current medications.  Labs today  Return in about 10 weeks (around 4/18/2022) for Medicare Wellness.          Answers for HPI/ROS submitted by the patient on 2/6/2022  What is the primary reason for your visit?: Other  Please describe your symptoms.: check up  Have you had these symptoms before?: No  How long have you been having these symptoms?: 1-4 days

## 2022-02-08 LAB
ALBUMIN SERPL-MCNC: 4.1 G/DL (ref 3.8–4.9)
ALBUMIN/GLOB SERPL: 1.5 {RATIO} (ref 1.2–2.2)
ALP SERPL-CCNC: 176 IU/L (ref 44–121)
ALT SERPL-CCNC: 11 IU/L (ref 0–44)
AST SERPL-CCNC: 15 IU/L (ref 0–40)
BILIRUB SERPL-MCNC: 0.2 MG/DL (ref 0–1.2)
BUN SERPL-MCNC: 8 MG/DL (ref 6–24)
BUN/CREAT SERPL: 10 (ref 9–20)
CALCIUM SERPL-MCNC: 10.1 MG/DL (ref 8.7–10.2)
CHLORIDE SERPL-SCNC: 104 MMOL/L (ref 96–106)
CHOLEST SERPL-MCNC: 131 MG/DL (ref 100–199)
CO2 SERPL-SCNC: 23 MMOL/L (ref 20–29)
CREAT SERPL-MCNC: 0.84 MG/DL (ref 0.76–1.27)
GLOBULIN SER CALC-MCNC: 2.8 G/DL (ref 1.5–4.5)
GLUCOSE SERPL-MCNC: 79 MG/DL (ref 65–99)
HDLC SERPL-MCNC: 36 MG/DL
LDLC SERPL CALC-MCNC: 79 MG/DL (ref 0–99)
POTASSIUM SERPL-SCNC: 4.2 MMOL/L (ref 3.5–5.2)
PROT SERPL-MCNC: 6.9 G/DL (ref 6–8.5)
SODIUM SERPL-SCNC: 141 MMOL/L (ref 134–144)
TRIGL SERPL-MCNC: 84 MG/DL (ref 0–149)
VLDLC SERPL CALC-MCNC: 16 MG/DL (ref 5–40)

## 2022-03-30 DIAGNOSIS — B30.9 ACUTE VIRAL CONJUNCTIVITIS OF LEFT EYE: ICD-10-CM

## 2022-03-30 RX ORDER — OFLOXACIN 3 MG/ML
SOLUTION/ DROPS OPHTHALMIC
Qty: 10 ML | Refills: 1 | OUTPATIENT
Start: 2022-03-30

## 2022-04-07 ENCOUNTER — OFFICE VISIT (OUTPATIENT)
Dept: FAMILY MEDICINE CLINIC | Facility: CLINIC | Age: 62
End: 2022-04-07

## 2022-04-07 VITALS
TEMPERATURE: 98 F | BODY MASS INDEX: 24.53 KG/M2 | HEIGHT: 65 IN | OXYGEN SATURATION: 100 % | SYSTOLIC BLOOD PRESSURE: 111 MMHG | WEIGHT: 147.2 LBS | HEART RATE: 83 BPM | DIASTOLIC BLOOD PRESSURE: 70 MMHG

## 2022-04-07 DIAGNOSIS — Z79.899 HIGH RISK MEDICATION USE: ICD-10-CM

## 2022-04-07 DIAGNOSIS — I10 ESSENTIAL HYPERTENSION: Primary | ICD-10-CM

## 2022-04-07 DIAGNOSIS — E78.2 MIXED HYPERLIPIDEMIA: ICD-10-CM

## 2022-04-07 PROCEDURE — 99214 OFFICE O/P EST MOD 30 MIN: CPT | Performed by: FAMILY MEDICINE

## 2022-04-07 NOTE — PROGRESS NOTES
Subjective  Answers for HPI/ROS submitted by the patient on 4/1/2022  What is the primary reason for your visit?: Other  Please describe your symptoms.: check up  Have you had these symptoms before?: Yes  How long have you been having these symptoms?: Greater than 2 weeks      Primo Sommers is a 61 y.o. male.     Chief Complaint   Patient presents with   • Follow-up       History of Present Illness     Hypertension follow-up stable.  Hyperlipidemia follow-up stable.  Vitamin D deficiency under good control patient was taken off vitamin D last visit.  No labs due.  Patient is to continue current medications.    The following portions of the patient's history were reviewed and updated as appropriate: allergies, current medications, past family history, past medical history, past social history, past surgical history and problem list.    Past Medical History:   Diagnosis Date   • Advance directive discussed with patient    • Disc degeneration, lumbar    • High risk medication use    • Hyperglycemia    • Hyperlipidemia    • Hypertension    • Leukocytosis    • Medicare annual wellness visit, initial    • Medicare annual wellness visit, subsequent    • Spinal stenosis        Past Surgical History:   Procedure Laterality Date   • BACK SURGERY         Family History   Problem Relation Age of Onset   • Diabetes Mother    • Hyperlipidemia Mother    • Hypertension Mother    • Kidney disease Mother    • Heart disease Mother    • Hypertension Father    • Depression Brother        Social History     Socioeconomic History   • Marital status: Single   Tobacco Use   • Smoking status: Current Every Day Smoker   • Smokeless tobacco: Never Used   Substance and Sexual Activity   • Alcohol use: Not Currently   • Drug use: Never   • Sexual activity: Yes       Current Outpatient Medications on File Prior to Visit   Medication Sig Dispense Refill   • amLODIPine (NORVASC) 5 MG tablet      • atorvastatin (LIPITOR) 20 MG tablet TAKE ONE  TABLET BY MOUTH DAILY 90 tablet 1   • folic acid (FOLVITE) 1 MG tablet Take 1 tablet by mouth Daily. 30 tablet 11   • gabapentin (NEURONTIN) 100 MG capsule Every 8 (Eight) Hours.     • lactulose (CHRONULAC) 10 GM/15ML solution lactulose 10 gram/15 mL oral solution     • lisinopril-hydrochlorothiazide (PRINZIDE,ZESTORETIC) 10-12.5 MG per tablet Take 1 tablet by mouth 2 (two) times a day. 60 tablet 11   • ofloxacin (Ocuflox) 0.3 % ophthalmic solution Administer 1 drop into the left eye 4 (Four) Times a Day. 10 mL 1   • [DISCONTINUED] Cholecalciferol (Vitamin D3) 1.25 MG (48462 UT) capsule Take 1 capsule by mouth Every 7 (Seven) Days. 12 capsule 3   • [DISCONTINUED] HYDROcodone-acetaminophen (NORCO)  MG per tablet hydrocodone 10 mg-acetaminophen 325 mg tablet       No current facility-administered medications on file prior to visit.       Review of Systems   Constitutional: Negative.        Recent Results (from the past 4704 hour(s))   Vitamin B12 & Folate    Collection Time: 11/12/21  9:20 AM    Specimen: Blood   Result Value Ref Range    Vitamin B-12 513 232 - 1,245 pg/mL    Folate >20.0 >3.0 ng/mL   Vitamin D 25 Hydroxy    Collection Time: 11/12/21  9:20 AM    Specimen: Blood   Result Value Ref Range    25 Hydroxy, Vitamin D 97.5 30.0 - 100.0 ng/mL   Comprehensive Metabolic Panel    Collection Time: 02/07/22 10:07 AM    Specimen: Blood   Result Value Ref Range    Glucose 79 65 - 99 mg/dL    BUN 8 6 - 24 mg/dL    Creatinine 0.84 0.76 - 1.27 mg/dL    eGFR Non African Am 96 >59 mL/min/1.73    eGFR African Am 111 >59 mL/min/1.73    BUN/Creatinine Ratio 10 9 - 20    Sodium 141 134 - 144 mmol/L    Potassium 4.2 3.5 - 5.2 mmol/L    Chloride 104 96 - 106 mmol/L    Total CO2 23 20 - 29 mmol/L    Calcium 10.1 8.7 - 10.2 mg/dL    Total Protein 6.9 6.0 - 8.5 g/dL    Albumin 4.1 3.8 - 4.9 g/dL    Globulin 2.8 1.5 - 4.5 g/dL    A/G Ratio 1.5 1.2 - 2.2    Total Bilirubin 0.2 0.0 - 1.2 mg/dL    Alkaline Phosphatase 176 (H) 44  "- 121 IU/L    AST (SGOT) 15 0 - 40 IU/L    ALT (SGPT) 11 0 - 44 IU/L   Lipid Panel    Collection Time: 02/07/22 10:07 AM    Specimen: Blood   Result Value Ref Range    Total Cholesterol 131 100 - 199 mg/dL    Triglycerides 84 0 - 149 mg/dL    HDL Cholesterol 36 (L) >39 mg/dL    VLDL Cholesterol Jaime 16 5 - 40 mg/dL    LDL Chol Calc (NIH) 79 0 - 99 mg/dL     Objective   Vitals:    04/07/22 1556   BP: 111/70   Pulse: 83   Temp: 98 °F (36.7 °C)   SpO2: 100%   Weight: 66.8 kg (147 lb 3.2 oz)   Height: 165.1 cm (65\")     Body mass index is 24.5 kg/m².  Physical Exam  Vitals and nursing note reviewed.   Constitutional:       General: He is not in acute distress.     Appearance: He is well-developed. He is not diaphoretic.   Cardiovascular:      Rate and Rhythm: Normal rate and regular rhythm.   Pulmonary:      Effort: Pulmonary effort is normal. No respiratory distress.      Breath sounds: Normal breath sounds. No wheezing.           Diagnoses and all orders for this visit:    1. Essential hypertension (Primary)    2. Mixed hyperlipidemia    3. High risk medication use          Return in about 4 weeks (around 5/5/2022) for Medicare Wellness.  Answers for HPI/ROS submitted by the patient on 4/1/2022  What is the primary reason for your visit?: Other  Please describe your symptoms.: check up  Have you had these symptoms before?: Yes  How long have you been having these symptoms?: Greater than 2 weeks        "

## 2022-05-12 ENCOUNTER — OFFICE VISIT (OUTPATIENT)
Dept: FAMILY MEDICINE CLINIC | Facility: CLINIC | Age: 62
End: 2022-05-12

## 2022-05-12 VITALS
HEIGHT: 65 IN | WEIGHT: 141 LBS | OXYGEN SATURATION: 97 % | BODY MASS INDEX: 23.49 KG/M2 | SYSTOLIC BLOOD PRESSURE: 120 MMHG | HEART RATE: 100 BPM | TEMPERATURE: 96.2 F | DIASTOLIC BLOOD PRESSURE: 76 MMHG

## 2022-05-12 DIAGNOSIS — Z00.00 MEDICARE ANNUAL WELLNESS VISIT, SUBSEQUENT: Primary | ICD-10-CM

## 2022-05-12 PROCEDURE — 1159F MED LIST DOCD IN RCRD: CPT | Performed by: FAMILY MEDICINE

## 2022-05-12 PROCEDURE — G0439 PPPS, SUBSEQ VISIT: HCPCS | Performed by: FAMILY MEDICINE

## 2022-05-12 PROCEDURE — 1170F FXNL STATUS ASSESSED: CPT | Performed by: FAMILY MEDICINE

## 2022-05-12 RX ORDER — GABAPENTIN 300 MG/1
CAPSULE ORAL EVERY 8 HOURS SCHEDULED
COMMUNITY

## 2022-05-12 RX ORDER — HYDROCODONE BITARTRATE AND ACETAMINOPHEN 10; 325 MG/1; MG/1
TABLET ORAL
COMMUNITY
Start: 2022-05-09

## 2022-05-12 NOTE — PROGRESS NOTES
The ABCs of the Annual Wellness Visit  Subsequent Medicare Wellness Visit    Chief Complaint   Patient presents with   • Annual Exam      Subjective    History of Present Illness:  Primo Sommers is a 61 y.o. male who presents for a Subsequent Medicare Wellness Visit.    The following portions of the patient's history were reviewed and   updated as appropriate: allergies, current medications, past family history, past medical history, past social history, past surgical history and problem list.    Compared to one year ago, the patient feels his physical   health is worse.    Compared to one year ago, the patient feels his mental   health is the same.    Recent Hospitalizations:  He was not admitted to the hospital during the last year.       Current Medical Providers:  Patient Care Team:  Lindsay Fernandes MD as PCP - General (Family Medicine)    Outpatient Medications Prior to Visit   Medication Sig Dispense Refill   • amLODIPine (NORVASC) 5 MG tablet      • atorvastatin (LIPITOR) 20 MG tablet TAKE ONE TABLET BY MOUTH DAILY 90 tablet 1   • folic acid (FOLVITE) 1 MG tablet Take 1 tablet by mouth Daily. 30 tablet 11   • gabapentin (NEURONTIN) 300 MG capsule Every 8 (Eight) Hours.     • HYDROcodone-acetaminophen (NORCO)  MG per tablet      • lactulose (CHRONULAC) 10 GM/15ML solution lactulose 10 gram/15 mL oral solution     • lisinopril-hydrochlorothiazide (PRINZIDE,ZESTORETIC) 10-12.5 MG per tablet Take 1 tablet by mouth 2 (two) times a day. 60 tablet 11   • ofloxacin (Ocuflox) 0.3 % ophthalmic solution Administer 1 drop into the left eye 4 (Four) Times a Day. 10 mL 1   • gabapentin (NEURONTIN) 100 MG capsule Every 8 (Eight) Hours.       No facility-administered medications prior to visit.       Opioid medication/s are on active medication list.  and I have evaluated his active treatment plan and pain score trends (see table).  There were no vitals filed for this visit.  I have reviewed the chart for  "potential of high risk medication and harmful drug interactions in the elderly.            Aspirin is not on active medication list.  Aspirin use is not indicated based on review of current medical condition/s. Risk of harm outweighs potential benefits.  .    Patient Active Problem List   Diagnosis   • Medicare annual wellness visit, subsequent   • Essential hypertension   • Immunization due   • High risk medication use   • Hyperlipidemia   • Constipation   • Long-term current use of drug therapy for attention deficit hyperactivity disorder (ADHD)   • Spinal stenosis of lumbar region   • Chronic pain disorder   • Complication of surgical procedure   • Low back pain   • Herniation of intervertebral disc   • Low back pain   • Lumbar radiculopathy   • Myofascial pain   • Osteoarthritis of cervical spine with myelopathy   • Pain in both lower extremities   • Pseudoarthrosis of spine   • Status post lumbar surgery   • Tobacco use   • Urinary retention   • Urticaria   • Drug-induced constipation   • Weight loss   • Chronic fatigue   • Vitamin D deficiency   • Rash   • Poison ivy   • Folate deficiency   • Acute viral conjunctivitis of left eye   • Fall   • Blunt head trauma     Advance Care Planning  Advance Directive is not on file.  ACP discussion was held with the patient during this visit. Patient does not have an advance directive, information provided.    Review of Systems   Constitutional: Negative.         Objective    Vitals:    05/12/22 1448   BP: 120/76   BP Location: Right arm   Patient Position: Sitting   Pulse: 100   Temp: 96.2 °F (35.7 °C)   SpO2: 97%   Weight: 64 kg (141 lb)   Height: 165.1 cm (65\")     BMI Readings from Last 1 Encounters:   05/12/22 23.46 kg/m²   BMI is within normal parameters. No follow-up required.    Does the patient have evidence of cognitive impairment? No    Physical Exam  Vitals and nursing note reviewed.   Constitutional:       Appearance: Normal appearance.   Neurological:      " Mental Status: He is alert.                 HEALTH RISK ASSESSMENT    Smoking Status:  Social History     Tobacco Use   Smoking Status Current Every Day Smoker   Smokeless Tobacco Never Used     Alcohol Consumption:  Social History     Substance and Sexual Activity   Alcohol Use Not Currently     Fall Risk Screen:    GILBERTSEDRICK Fall Risk Assessment was completed, and patient is at LOW risk for falls.Assessment completed on:5/12/2022    Depression Screening:  PHQ-2/PHQ-9 Depression Screening 5/12/2022   Retired PHQ-9 Total Score -   Retired Total Score -   Little Interest or Pleasure in Doing Things 0-->not at all   Feeling Down, Depressed or Hopeless 0-->not at all   PHQ-9: Brief Depression Severity Measure Score 0       Health Habits and Functional and Cognitive Screening:  Functional & Cognitive Status 5/12/2022   Do you have difficulty preparing food and eating? No   Do you have difficulty bathing yourself, getting dressed or grooming yourself? No   Do you have difficulty using the toilet? No   Do you have difficulty moving around from place to place? No   Do you have trouble with steps or getting out of a bed or a chair? No   Current Diet Well Balanced Diet   Dental Exam Not up to date   Eye Exam Not up to date   Exercise (times per week) 0 times per week   Current Exercises Include No Regular Exercise   Current Exercise Activities Include -   Do you need help using the phone?  No   Are you deaf or do you have serious difficulty hearing?  No   Do you need help with transportation? No   Do you need help shopping? No   Do you need help preparing meals?  No   Do you need help with housework?  No   Do you need help with laundry? No   Do you need help taking your medications? No   Do you need help managing money? No   Do you ever drive or ride in a car without wearing a seat belt? No   Have you felt unusual stress, anger or loneliness in the last month? No   Who do you live with? Other   If you need help, do you have  trouble finding someone available to you? No   Have you been bothered in the last four weeks by sexual problems? No   Do you have difficulty concentrating, remembering or making decisions? No       Age-appropriate Screening Schedule:  Refer to the list below for future screening recommendations based on patient's age, sex and/or medical conditions. Orders for these recommended tests are listed in the plan section. The patient has been provided with a written plan.    Health Maintenance   Topic Date Due   • TDAP/TD VACCINES (1 - Tdap) Never done   • ZOSTER VACCINE (1 of 2) Never done   • INFLUENZA VACCINE  08/01/2022   • LIPID PANEL  02/07/2023              Assessment & Plan   CMS Preventative Services Quick Reference  Risk Factors Identified During Encounter  Fall Risk-High or Moderate  The above risks/problems have been discussed with the patient.  Follow up actions/plans if indicated are seen below in the Assessment/Plan Section.  Pertinent information has been shared with the patient in the After Visit Summary.    Diagnoses and all orders for this visit:    1. Medicare annual wellness visit, subsequent (Primary)        Follow Up:    Return in about 3 months (around 8/12/2022).      An After Visit Summary and PPPS were made available to the patient.

## 2022-05-24 DIAGNOSIS — I10 ESSENTIAL HYPERTENSION: ICD-10-CM

## 2022-05-24 RX ORDER — LISINOPRIL AND HYDROCHLOROTHIAZIDE 12.5; 1 MG/1; MG/1
TABLET ORAL
Qty: 60 TABLET | Refills: 2 | Status: SHIPPED | OUTPATIENT
Start: 2022-05-24 | End: 2022-08-22 | Stop reason: SDUPTHER

## 2022-05-24 RX ORDER — AMLODIPINE BESYLATE 5 MG/1
TABLET ORAL
Qty: 30 TABLET | Refills: 2 | Status: SHIPPED | OUTPATIENT
Start: 2022-05-24 | End: 2022-08-22 | Stop reason: SDUPTHER

## 2022-05-24 NOTE — TELEPHONE ENCOUNTER
Rx Refill Note  Requested Prescriptions     Pending Prescriptions Disp Refills   • amLODIPine (NORVASC) 5 MG tablet [Pharmacy Med Name: amLODIPine BESYLATE 5 MG TAB] 30 tablet      Sig: TAKE ONE TABLET BY MOUTH DAILY   • lisinopril-hydrochlorothiazide (PRINZIDE,ZESTORETIC) 10-12.5 MG per tablet [Pharmacy Med Name: LISINOPRIL-HCTZ 10-12.5 MG TAB] 60 tablet 11     Sig: TAKE ONE TABLET BY MOUTH TWICE A DAY      Last office visit with prescribing clinician: 5/12/2022      Next office visit with prescribing clinician: 8/11/2022            Alma Delia Guaman MA  05/24/22, 07:23 EDT     Last Filled 11/09/21  Last Filled 04/16/21    LOV 04/07/22

## 2022-08-22 ENCOUNTER — OFFICE VISIT (OUTPATIENT)
Dept: FAMILY MEDICINE CLINIC | Facility: CLINIC | Age: 62
End: 2022-08-22

## 2022-08-22 VITALS
DIASTOLIC BLOOD PRESSURE: 79 MMHG | SYSTOLIC BLOOD PRESSURE: 152 MMHG | BODY MASS INDEX: 24.06 KG/M2 | OXYGEN SATURATION: 98 % | TEMPERATURE: 97.3 F | HEIGHT: 65 IN | HEART RATE: 63 BPM | WEIGHT: 144.4 LBS

## 2022-08-22 DIAGNOSIS — F17.200 SMOKING: ICD-10-CM

## 2022-08-22 DIAGNOSIS — Z23 IMMUNIZATION DUE: ICD-10-CM

## 2022-08-22 DIAGNOSIS — Z79.899 HIGH RISK MEDICATION USE: ICD-10-CM

## 2022-08-22 DIAGNOSIS — E55.9 VITAMIN D DEFICIENCY: ICD-10-CM

## 2022-08-22 DIAGNOSIS — I10 ESSENTIAL HYPERTENSION: Primary | ICD-10-CM

## 2022-08-22 DIAGNOSIS — F17.210 CIGARETTE SMOKER: ICD-10-CM

## 2022-08-22 DIAGNOSIS — E53.8 FOLIC ACID DEFICIENCY: ICD-10-CM

## 2022-08-22 DIAGNOSIS — E78.2 MIXED HYPERLIPIDEMIA: ICD-10-CM

## 2022-08-22 PROCEDURE — 0051A COVID-19 (PFIZER) 12+ YRS: CPT | Performed by: FAMILY MEDICINE

## 2022-08-22 PROCEDURE — 99214 OFFICE O/P EST MOD 30 MIN: CPT | Performed by: FAMILY MEDICINE

## 2022-08-22 PROCEDURE — 91305 COVID-19 (PFIZER) 12+ YRS: CPT | Performed by: FAMILY MEDICINE

## 2022-08-22 RX ORDER — FOLIC ACID 1 MG/1
1 TABLET ORAL DAILY
Qty: 30 TABLET | Refills: 11 | Status: SHIPPED | OUTPATIENT
Start: 2022-08-22

## 2022-08-22 RX ORDER — LISINOPRIL AND HYDROCHLOROTHIAZIDE 12.5; 1 MG/1; MG/1
1 TABLET ORAL 2 TIMES DAILY
Qty: 60 TABLET | Refills: 11 | Status: SHIPPED | OUTPATIENT
Start: 2022-08-22

## 2022-08-22 RX ORDER — ATORVASTATIN CALCIUM 20 MG/1
20 TABLET, FILM COATED ORAL DAILY
Qty: 90 TABLET | Refills: 3 | Status: SHIPPED | OUTPATIENT
Start: 2022-08-22

## 2022-08-22 RX ORDER — AMLODIPINE BESYLATE 5 MG/1
5 TABLET ORAL DAILY
Qty: 30 TABLET | Refills: 11 | Status: SHIPPED | OUTPATIENT
Start: 2022-08-22

## 2022-08-22 NOTE — PROGRESS NOTES
Subjective   Primo Sommers is a 61 y.o. male.     Chief Complaint   Patient presents with   • Hypertension       History of Present Illness     Hypertension stable current medications.  Due for labs.  Hyperlipidemia stable on current medications.  Due for labs.  Vitamin D stable on current medications.  Due for labs.    The following portions of the patient's history were reviewed and updated as appropriate: allergies, current medications, past family history, past medical history, past social history, past surgical history and problem list.    Past Medical History:   Diagnosis Date   • Advance directive discussed with patient    • Disc degeneration, lumbar    • High risk medication use    • Hyperglycemia    • Hyperlipidemia    • Hypertension    • Leukocytosis    • Medicare annual wellness visit, initial    • Medicare annual wellness visit, subsequent    • Spinal stenosis        Past Surgical History:   Procedure Laterality Date   • BACK SURGERY         Family History   Problem Relation Age of Onset   • Diabetes Mother    • Hyperlipidemia Mother    • Hypertension Mother    • Kidney disease Mother    • Heart disease Mother    • Hypertension Father    • Depression Brother        Social History     Socioeconomic History   • Marital status: Single   Tobacco Use   • Smoking status: Current Every Day Smoker   • Smokeless tobacco: Never Used   Substance and Sexual Activity   • Alcohol use: Not Currently   • Drug use: Never   • Sexual activity: Yes       Current Outpatient Medications on File Prior to Visit   Medication Sig Dispense Refill   • gabapentin (NEURONTIN) 300 MG capsule Every 8 (Eight) Hours.     • HYDROcodone-acetaminophen (NORCO)  MG per tablet      • lactulose (CHRONULAC) 10 GM/15ML solution lactulose 10 gram/15 mL oral solution     • [DISCONTINUED] amLODIPine (NORVASC) 5 MG tablet TAKE ONE TABLET BY MOUTH DAILY 30 tablet 2   • [DISCONTINUED] atorvastatin (LIPITOR) 20 MG tablet TAKE ONE TABLET BY  "MOUTH DAILY 90 tablet 1   • [DISCONTINUED] folic acid (FOLVITE) 1 MG tablet Take 1 tablet by mouth Daily. 30 tablet 11   • [DISCONTINUED] lisinopril-hydrochlorothiazide (PRINZIDE,ZESTORETIC) 10-12.5 MG per tablet TAKE ONE TABLET BY MOUTH TWICE A DAY 60 tablet 2   • [DISCONTINUED] ofloxacin (Ocuflox) 0.3 % ophthalmic solution Administer 1 drop into the left eye 4 (Four) Times a Day. 10 mL 1     No current facility-administered medications on file prior to visit.       Review of Systems   Constitutional: Negative.        Recent Results (from the past 4704 hour(s))   Comprehensive Metabolic Panel    Collection Time: 02/07/22 10:07 AM    Specimen: Blood   Result Value Ref Range    Glucose 79 65 - 99 mg/dL    BUN 8 6 - 24 mg/dL    Creatinine 0.84 0.76 - 1.27 mg/dL    eGFR Non African Am 96 >59 mL/min/1.73    eGFR African Am 111 >59 mL/min/1.73    BUN/Creatinine Ratio 10 9 - 20    Sodium 141 134 - 144 mmol/L    Potassium 4.2 3.5 - 5.2 mmol/L    Chloride 104 96 - 106 mmol/L    Total CO2 23 20 - 29 mmol/L    Calcium 10.1 8.7 - 10.2 mg/dL    Total Protein 6.9 6.0 - 8.5 g/dL    Albumin 4.1 3.8 - 4.9 g/dL    Globulin 2.8 1.5 - 4.5 g/dL    A/G Ratio 1.5 1.2 - 2.2    Total Bilirubin 0.2 0.0 - 1.2 mg/dL    Alkaline Phosphatase 176 (H) 44 - 121 IU/L    AST (SGOT) 15 0 - 40 IU/L    ALT (SGPT) 11 0 - 44 IU/L   Lipid Panel    Collection Time: 02/07/22 10:07 AM    Specimen: Blood   Result Value Ref Range    Total Cholesterol 131 100 - 199 mg/dL    Triglycerides 84 0 - 149 mg/dL    HDL Cholesterol 36 (L) >39 mg/dL    VLDL Cholesterol Jaime 16 5 - 40 mg/dL    LDL Chol Calc (NIH) 79 0 - 99 mg/dL     Objective   Vitals:    08/22/22 0757   BP: 152/79   BP Location: Right arm   Patient Position: Sitting   Pulse: 63   Temp: 97.3 °F (36.3 °C)   SpO2: 98%   Weight: 65.5 kg (144 lb 6.4 oz)   Height: 165.1 cm (65\")     Body mass index is 24.03 kg/m².  Physical Exam  Vitals and nursing note reviewed.   Constitutional:       General: He is not in " acute distress.     Appearance: He is well-developed. He is not diaphoretic.   Cardiovascular:      Rate and Rhythm: Normal rate and regular rhythm.   Pulmonary:      Effort: Pulmonary effort is normal. No respiratory distress.      Breath sounds: Normal breath sounds. No wheezing.           Diagnoses and all orders for this visit:    1. Essential hypertension (Primary)  -     Comprehensive Metabolic Panel  -     Lipid Panel  -     CBC & Differential  -     amLODIPine (NORVASC) 5 MG tablet; Take 1 tablet by mouth Daily.  Dispense: 30 tablet; Refill: 11  -     lisinopril-hydrochlorothiazide (PRINZIDE,ZESTORETIC) 10-12.5 MG per tablet; Take 1 tablet by mouth 2 (Two) Times a Day.  Dispense: 60 tablet; Refill: 11    2. Mixed hyperlipidemia  -     Comprehensive Metabolic Panel  -     Lipid Panel  -     CBC & Differential  -     atorvastatin (LIPITOR) 20 MG tablet; Take 1 tablet by mouth Daily.  Dispense: 90 tablet; Refill: 3    3. High risk medication use    4. Immunization due  -     COVID-19 Vaccine (Pfizer) Gray Cap  -     Pneumococcal Conjugate Vaccine 20-Valent (PCV20)    5. Vitamin D deficiency  -     Vitamin D 25 Hydroxy    6. Folic acid deficiency  -     folic acid (FOLVITE) 1 MG tablet; Take 1 tablet by mouth Daily.  Dispense: 30 tablet; Refill: 11    7. Smoking  -      CT Chest Low Dose Cancer Screening WO; Future    8. Cigarette smoker  -      CT Chest Low Dose Cancer Screening WO; Future          Return in about 3 months (around 11/22/2022) for HYPERTENSION.

## 2022-08-23 LAB
25(OH)D3+25(OH)D2 SERPL-MCNC: 65.5 NG/ML (ref 30–100)
ALBUMIN SERPL-MCNC: 4.6 G/DL (ref 3.8–4.9)
ALBUMIN/GLOB SERPL: 2 {RATIO} (ref 1.2–2.2)
ALP SERPL-CCNC: 151 IU/L (ref 44–121)
ALT SERPL-CCNC: 11 IU/L (ref 0–44)
AST SERPL-CCNC: 13 IU/L (ref 0–40)
BASOPHILS # BLD AUTO: 0.1 X10E3/UL (ref 0–0.2)
BASOPHILS NFR BLD AUTO: 1 %
BILIRUB SERPL-MCNC: 0.3 MG/DL (ref 0–1.2)
BUN SERPL-MCNC: 6 MG/DL (ref 6–24)
BUN/CREAT SERPL: 7 (ref 9–20)
CALCIUM SERPL-MCNC: 9.6 MG/DL (ref 8.7–10.2)
CHLORIDE SERPL-SCNC: 107 MMOL/L (ref 96–106)
CHOLEST SERPL-MCNC: 152 MG/DL (ref 100–199)
CO2 SERPL-SCNC: 27 MMOL/L (ref 20–29)
CREAT SERPL-MCNC: 0.84 MG/DL (ref 0.76–1.27)
EGFRCR-CYS SERPLBLD CKD-EPI 2021: 100 ML/MIN/1.73
EOSINOPHIL # BLD AUTO: 0.3 X10E3/UL (ref 0–0.4)
EOSINOPHIL NFR BLD AUTO: 4 %
ERYTHROCYTE [DISTWIDTH] IN BLOOD BY AUTOMATED COUNT: 15.1 % (ref 11.6–15.4)
GLOBULIN SER CALC-MCNC: 2.3 G/DL (ref 1.5–4.5)
GLUCOSE SERPL-MCNC: 79 MG/DL (ref 65–99)
HCT VFR BLD AUTO: 42.1 % (ref 37.5–51)
HDLC SERPL-MCNC: 39 MG/DL
HGB BLD-MCNC: 14 G/DL (ref 13–17.7)
IMM GRANULOCYTES # BLD AUTO: 0 X10E3/UL (ref 0–0.1)
IMM GRANULOCYTES NFR BLD AUTO: 0 %
LDLC SERPL CALC-MCNC: 101 MG/DL (ref 0–99)
LYMPHOCYTES # BLD AUTO: 4.4 X10E3/UL (ref 0.7–3.1)
LYMPHOCYTES NFR BLD AUTO: 45 %
MCH RBC QN AUTO: 28.4 PG (ref 26.6–33)
MCHC RBC AUTO-ENTMCNC: 33.3 G/DL (ref 31.5–35.7)
MCV RBC AUTO: 85 FL (ref 79–97)
MONOCYTES # BLD AUTO: 0.4 X10E3/UL (ref 0.1–0.9)
MONOCYTES NFR BLD AUTO: 4 %
NEUTROPHILS # BLD AUTO: 4.4 X10E3/UL (ref 1.4–7)
NEUTROPHILS NFR BLD AUTO: 46 %
PLATELET # BLD AUTO: 205 X10E3/UL (ref 150–450)
POTASSIUM SERPL-SCNC: 4.1 MMOL/L (ref 3.5–5.2)
PROT SERPL-MCNC: 6.9 G/DL (ref 6–8.5)
RBC # BLD AUTO: 4.93 X10E6/UL (ref 4.14–5.8)
SODIUM SERPL-SCNC: 145 MMOL/L (ref 134–144)
TRIGL SERPL-MCNC: 60 MG/DL (ref 0–149)
VLDLC SERPL CALC-MCNC: 12 MG/DL (ref 5–40)
WBC # BLD AUTO: 9.7 X10E3/UL (ref 3.4–10.8)

## 2022-11-09 DIAGNOSIS — E55.9 VITAMIN D DEFICIENCY: ICD-10-CM

## 2022-11-10 RX ORDER — METHOCARBAMOL 750 MG/1
TABLET ORAL
Qty: 12 CAPSULE | Refills: 3 | OUTPATIENT
Start: 2022-11-10

## 2022-11-10 NOTE — TELEPHONE ENCOUNTER
Rx Refill Note  Requested Prescriptions     Pending Prescriptions Disp Refills   • D3-50 1.25 MG (75663 UT) capsule [Pharmacy Med Name: VITAMIN D3-50 (CHOLECAL) 50,000U CP] 12 capsule 3     Sig: TAKE ONE CAPSULE BY MOUTH ONCE WEEKLY      Last office visit with prescribing clinician: 8/22/2022      Next office visit with prescribing clinician: 11/21/2022   The original prescription was discontinued on 4/7/2022 by Lindsay Fernandes MD. Renewing this prescription may not be appropriate.

## 2022-11-21 ENCOUNTER — OFFICE VISIT (OUTPATIENT)
Dept: FAMILY MEDICINE CLINIC | Facility: CLINIC | Age: 62
End: 2022-11-21

## 2022-11-21 VITALS
WEIGHT: 146.6 LBS | OXYGEN SATURATION: 100 % | HEART RATE: 75 BPM | TEMPERATURE: 98.2 F | BODY MASS INDEX: 24.43 KG/M2 | SYSTOLIC BLOOD PRESSURE: 138 MMHG | HEIGHT: 65 IN | DIASTOLIC BLOOD PRESSURE: 90 MMHG

## 2022-11-21 DIAGNOSIS — Z23 IMMUNIZATION DUE: ICD-10-CM

## 2022-11-21 DIAGNOSIS — E78.2 MIXED HYPERLIPIDEMIA: ICD-10-CM

## 2022-11-21 DIAGNOSIS — I10 ESSENTIAL HYPERTENSION: Primary | ICD-10-CM

## 2022-11-21 PROCEDURE — 91312 COVID-19 (PFIZER) BIVALENT BOOSTER 12+YRS: CPT | Performed by: FAMILY MEDICINE

## 2022-11-21 PROCEDURE — 90686 IIV4 VACC NO PRSV 0.5 ML IM: CPT | Performed by: FAMILY MEDICINE

## 2022-11-21 PROCEDURE — 0124A COVID-19 (PFIZER) BIVALENT BOOSTER 12+YRS: CPT | Performed by: FAMILY MEDICINE

## 2022-11-21 PROCEDURE — 99213 OFFICE O/P EST LOW 20 MIN: CPT | Performed by: FAMILY MEDICINE

## 2022-11-21 PROCEDURE — G0008 ADMIN INFLUENZA VIRUS VAC: HCPCS | Performed by: FAMILY MEDICINE

## 2022-11-21 NOTE — PROGRESS NOTES
Subjective  Answers for HPI/ROS submitted by the patient on 11/20/2022  What is the primary reason for your visit?: Other  Please describe your symptoms.: Check Up  Have you had these symptoms before?: No  How long have you been having these symptoms?: 1-4 days  Please list any medications you are currently taking for this condition.: NA  Please describe any probable cause for these symptoms. : JATINDER Sommers is a 61 y.o. male.     Chief Complaint   Patient presents with   • Hypertension       History of Present Illness   Hypertension much improved.  Patient is asymptomatic.  Hyperlipidemia stable.  Reviewed all of his labs from last visit.  He is due for shots today    The following portions of the patient's history were reviewed and updated as appropriate: allergies, current medications, past family history, past medical history, past social history, past surgical history and problem list.    Past Medical History:   Diagnosis Date   • Advance directive discussed with patient    • Disc degeneration, lumbar    • High risk medication use    • Hyperglycemia    • Hyperlipidemia    • Hypertension    • Leukocytosis    • Medicare annual wellness visit, initial    • Medicare annual wellness visit, subsequent    • Spinal stenosis        Past Surgical History:   Procedure Laterality Date   • BACK SURGERY         Family History   Problem Relation Age of Onset   • Diabetes Mother    • Hyperlipidemia Mother    • Hypertension Mother    • Kidney disease Mother    • Heart disease Mother    • Hypertension Father    • Depression Brother        Social History     Socioeconomic History   • Marital status: Single   Tobacco Use   • Smoking status: Some Days     Packs/day: 1.00     Years: 10.00     Pack years: 10.00     Types: Cigarettes   • Smokeless tobacco: Never   Substance and Sexual Activity   • Alcohol use: Not Currently   • Drug use: Never   • Sexual activity: Yes       Current Outpatient Medications on File Prior to  Visit   Medication Sig Dispense Refill   • amLODIPine (NORVASC) 5 MG tablet Take 1 tablet by mouth Daily. 30 tablet 11   • atorvastatin (LIPITOR) 20 MG tablet Take 1 tablet by mouth Daily. 90 tablet 3   • folic acid (FOLVITE) 1 MG tablet Take 1 tablet by mouth Daily. 30 tablet 11   • gabapentin (NEURONTIN) 300 MG capsule Every 8 (Eight) Hours.     • HYDROcodone-acetaminophen (NORCO)  MG per tablet      • lactulose (CHRONULAC) 10 GM/15ML solution lactulose 10 gram/15 mL oral solution     • lisinopril-hydrochlorothiazide (PRINZIDE,ZESTORETIC) 10-12.5 MG per tablet Take 1 tablet by mouth 2 (Two) Times a Day. 60 tablet 11     No current facility-administered medications on file prior to visit.       Review of Systems   Constitutional: Negative.        Recent Results (from the past 4704 hour(s))   Vitamin D 25 Hydroxy    Collection Time: 08/22/22  8:26 AM    Specimen: Blood   Result Value Ref Range    25 Hydroxy, Vitamin D 65.5 30.0 - 100.0 ng/mL   Comprehensive Metabolic Panel    Collection Time: 08/22/22  8:26 AM    Specimen: Blood   Result Value Ref Range    Glucose 79 65 - 99 mg/dL    BUN 6 6 - 24 mg/dL    Creatinine 0.84 0.76 - 1.27 mg/dL    EGFR Result 100 >59 mL/min/1.73    BUN/Creatinine Ratio 7 (L) 9 - 20    Sodium 145 (H) 134 - 144 mmol/L    Potassium 4.1 3.5 - 5.2 mmol/L    Chloride 107 (H) 96 - 106 mmol/L    Total CO2 27 20 - 29 mmol/L    Calcium 9.6 8.7 - 10.2 mg/dL    Total Protein 6.9 6.0 - 8.5 g/dL    Albumin 4.6 3.8 - 4.9 g/dL    Globulin 2.3 1.5 - 4.5 g/dL    A/G Ratio 2.0 1.2 - 2.2    Total Bilirubin 0.3 0.0 - 1.2 mg/dL    Alkaline Phosphatase 151 (H) 44 - 121 IU/L    AST (SGOT) 13 0 - 40 IU/L    ALT (SGPT) 11 0 - 44 IU/L   Lipid Panel    Collection Time: 08/22/22  8:26 AM    Specimen: Blood   Result Value Ref Range    Total Cholesterol 152 100 - 199 mg/dL    Triglycerides 60 0 - 149 mg/dL    HDL Cholesterol 39 (L) >39 mg/dL    VLDL Cholesterol Jaime 12 5 - 40 mg/dL    LDL Chol Calc (RUST) 101 (H) 0  "- 99 mg/dL   CBC & Differential    Collection Time: 08/22/22  8:26 AM    Specimen: Blood   Result Value Ref Range    WBC 9.7 3.4 - 10.8 x10E3/uL    RBC 4.93 4.14 - 5.80 x10E6/uL    Hemoglobin 14.0 13.0 - 17.7 g/dL    Hematocrit 42.1 37.5 - 51.0 %    MCV 85 79 - 97 fL    MCH 28.4 26.6 - 33.0 pg    MCHC 33.3 31.5 - 35.7 g/dL    RDW 15.1 11.6 - 15.4 %    Platelets 205 150 - 450 x10E3/uL    Neutrophil Rel % 46 Not Estab. %    Lymphocyte Rel % 45 Not Estab. %    Monocyte Rel % 4 Not Estab. %    Eosinophil Rel % 4 Not Estab. %    Basophil Rel % 1 Not Estab. %    Neutrophils Absolute 4.4 1.4 - 7.0 x10E3/uL    Lymphocytes Absolute 4.4 (H) 0.7 - 3.1 x10E3/uL    Monocytes Absolute 0.4 0.1 - 0.9 x10E3/uL    Eosinophils Absolute 0.3 0.0 - 0.4 x10E3/uL    Basophils Absolute 0.1 0.0 - 0.2 x10E3/uL    Immature Granulocyte Rel % 0 Not Estab. %    Immature Grans Absolute 0.0 0.0 - 0.1 x10E3/uL     Objective   Vitals:    11/21/22 1500   BP: 138/90   BP Location: Right arm   Patient Position: Sitting   Cuff Size: Adult   Pulse: 75   Temp: 98.2 °F (36.8 °C)   TempSrc: Temporal   SpO2: 100%   Weight: 66.5 kg (146 lb 9.6 oz)   Height: 165.1 cm (65\")     Body mass index is 24.4 kg/m².  Physical Exam  Vitals and nursing note reviewed.   Constitutional:       General: He is not in acute distress.     Appearance: He is well-developed. He is not diaphoretic.   Cardiovascular:      Rate and Rhythm: Normal rate and regular rhythm.   Pulmonary:      Effort: Pulmonary effort is normal. No respiratory distress.      Breath sounds: Normal breath sounds. No wheezing.           Diagnoses and all orders for this visit:    1. Essential hypertension (Primary)    2. Mixed hyperlipidemia    3. Immunization due  -     FluLaval/Fluzone >6 mos (3410-5364)  -     COVID-19 Bivalent Booster (Pfizer) 12+yrs        Return in about 6 months (around 5/21/2023) for HYPERLIPIDEMIA.        "

## 2023-05-11 RX ORDER — LACTULOSE 10 G/15ML
10 SOLUTION ORAL DAILY
Qty: 450 ML | Refills: 0 | Status: SHIPPED | OUTPATIENT
Start: 2023-05-11

## 2023-05-11 NOTE — TELEPHONE ENCOUNTER
LOV          11/21/21  NOV          Visit date not found   Last RF    1/2/23 per pharm  7/27/21 per chart     Last refill for pt.   Dr. Fernandes is no longer at our office.  You will need to find a new Primary Care Provider. Mailed letter    ZELALEM Silva/ASHISH

## 2023-08-23 DIAGNOSIS — E78.2 MIXED HYPERLIPIDEMIA: ICD-10-CM

## 2023-08-23 RX ORDER — ATORVASTATIN CALCIUM 20 MG/1
20 TABLET, FILM COATED ORAL DAILY
Qty: 30 TABLET | Refills: 1 | Status: SHIPPED | OUTPATIENT
Start: 2023-08-23

## 2023-08-23 NOTE — TELEPHONE ENCOUNTER
Rx Refill Note  Requested Prescriptions     Pending Prescriptions Disp Refills    atorvastatin (LIPITOR) 20 MG tablet 30 tablet 1     Sig: Take 1 tablet by mouth Daily.      Last office visit with prescribing clinician: Visit date not found   Last telemedicine visit with prescribing clinician: Visit date not found   Next office visit with prescribing clinician: Visit date not found                         Would you like a call back once the refill request has been completed: [] Yes [] No    If the office needs to give you a call back, can they leave a voicemail: [] Yes [] No    Carolyn Reyes MA  08/23/23, 10:12 EDT    Patient needs to be seen for further refills